# Patient Record
Sex: MALE | Race: WHITE | NOT HISPANIC OR LATINO | Employment: OTHER | URBAN - METROPOLITAN AREA
[De-identification: names, ages, dates, MRNs, and addresses within clinical notes are randomized per-mention and may not be internally consistent; named-entity substitution may affect disease eponyms.]

---

## 2023-03-15 ENCOUNTER — OFFICE VISIT (OUTPATIENT)
Dept: INTERNAL MEDICINE | Age: 33
End: 2023-03-15
Payer: COMMERCIAL

## 2023-03-15 VITALS
BODY MASS INDEX: 26.49 KG/M2 | OXYGEN SATURATION: 97 % | DIASTOLIC BLOOD PRESSURE: 70 MMHG | SYSTOLIC BLOOD PRESSURE: 120 MMHG | TEMPERATURE: 97.6 F | WEIGHT: 159 LBS | HEART RATE: 105 BPM | HEIGHT: 65 IN

## 2023-03-15 DIAGNOSIS — Z76.89 ENCOUNTER TO ESTABLISH CARE: Primary | ICD-10-CM

## 2023-03-15 DIAGNOSIS — Z97.4 WEARS HEARING AID IN BOTH EARS: ICD-10-CM

## 2023-03-15 DIAGNOSIS — F84.9 PERVASIVE DEVELOPMENTAL DISORDER, ACTIVE: ICD-10-CM

## 2023-03-15 DIAGNOSIS — F90.9 ATTENTION DEFICIT HYPERACTIVITY DISORDER (ADHD), UNSPECIFIED ADHD TYPE: ICD-10-CM

## 2023-03-15 DIAGNOSIS — K76.0 FATTY LIVER: ICD-10-CM

## 2023-03-15 DIAGNOSIS — Z79.899 HIGH RISK MEDICATION USE: ICD-10-CM

## 2023-03-15 DIAGNOSIS — G40.309 GENERALIZED IDIOPATHIC EPILEPSY, NOT INTRACTABLE, WITHOUT STATUS EPILEPTICUS: ICD-10-CM

## 2023-03-15 PROCEDURE — 99204 OFFICE O/P NEW MOD 45 MIN: CPT

## 2023-03-15 PROCEDURE — 1160F RVW MEDS BY RX/DR IN RCRD: CPT

## 2023-03-15 PROCEDURE — 1159F MED LIST DOCD IN RCRD: CPT

## 2023-03-15 RX ORDER — LAMOTRIGINE 100 MG/1
1 TABLET ORAL 2 TIMES DAILY
COMMUNITY
Start: 2022-11-14 | End: 2023-03-15 | Stop reason: SDUPTHER

## 2023-03-15 RX ORDER — LANOLIN ALCOHOL/MO/W.PET/CERES
5 CREAM (GRAM) TOPICAL DAILY
COMMUNITY
End: 2023-03-15

## 2023-03-15 RX ORDER — DIETHYLTOLUAMIDE 7 %
SPRAY, NON-AEROSOL (ML) TOPICAL
COMMUNITY

## 2023-03-15 RX ORDER — CHOLECALCIFEROL (VITAMIN D3) 25 MCG
CAPSULE ORAL
COMMUNITY

## 2023-03-15 RX ORDER — MULTIPLE VITAMINS W/ MINERALS TAB 9MG-400MCG
TAB ORAL
COMMUNITY

## 2023-03-15 RX ORDER — UREA 10 %
1 LOTION (ML) TOPICAL DAILY PRN
COMMUNITY
End: 2023-03-15

## 2023-03-15 RX ORDER — LAMOTRIGINE 100 MG/1
100 TABLET ORAL 2 TIMES DAILY
Qty: 180 TABLET | Refills: 0 | Status: SHIPPED | OUTPATIENT
Start: 2023-03-15

## 2023-03-15 RX ORDER — ESCITALOPRAM OXALATE 10 MG/1
1 TABLET ORAL DAILY
COMMUNITY
Start: 2023-02-11 | End: 2023-03-15 | Stop reason: SDUPTHER

## 2023-03-15 RX ORDER — FEXOFENADINE HCL 180 MG/1
180 TABLET ORAL DAILY
COMMUNITY

## 2023-03-15 RX ORDER — ESCITALOPRAM OXALATE 10 MG/1
10 TABLET ORAL DAILY
Qty: 90 TABLET | Refills: 0 | Status: SHIPPED | OUTPATIENT
Start: 2023-03-15

## 2023-03-15 RX ORDER — ESCITALOPRAM OXALATE 10 MG/1
TABLET ORAL
COMMUNITY
End: 2023-03-15

## 2023-03-15 NOTE — PROGRESS NOTES
"    I N T E R N A L  M E D I C I N E  Ciarra Rosenberg, APRN    ENCOUNTER DATE:  03/15/2023    Nicko Vega / 32 y.o. / male      CHIEF COMPLAINT / REASON FOR OFFICE VISIT     Establish Care      ASSESSMENT & PLAN     Diagnoses and all orders for this visit:    1. Encounter to establish care (Primary)    2. Generalized idiopathic epilepsy, not intractable, without status epilepticus (HCC)  -     Ambulatory Referral to Neurology  -     lamoTRIgine (LaMICtal) 100 MG tablet; Take 1 tablet by mouth 2 (Two) Times a Day.  Dispense: 180 tablet; Refill: 0    3. Pervasive developmental disorder, active  Overview:  Formatting of this note might be different from the original.  Overview:   KENISHA GRACE MD    Orders:  -     Ambulatory Referral to Psychiatry  -     Ambulatory Referral to Behavioral Health  -     escitalopram (LEXAPRO) 10 MG tablet; Take 1 tablet by mouth Daily.  Dispense: 90 tablet; Refill: 0    4. Attention deficit hyperactivity disorder (ADHD), unspecified ADHD type  Overview:  Formatting of this note might be different from the original.  Overview:   KENISHA GRACE MD    Orders:  -     Ambulatory Referral to Psychiatry  -     Ambulatory Referral to Behavioral Health  -     escitalopram (LEXAPRO) 10 MG tablet; Take 1 tablet by mouth Daily.  Dispense: 90 tablet; Refill: 0    5. High risk medication use  -     Lamotrigine level    6. Fatty liver  -     Comprehensive metabolic panel    7. Wears hearing aid in both ears  -     Ambulatory Referral to Audiology       SUMMARY/DISCUSSION  • Referrals placed per pt request.  • Agreeable to update annual physical in 3 months.      Next Appointment with me: Visit date not found    Return in about 3 months (around 6/15/2023) for Annual physical.      VITAL SIGNS     Visit Vitals  /70   Pulse 105   Temp 97.6 °F (36.4 °C)   Ht 165.1 cm (65\")   Wt 72.1 kg (159 lb)   SpO2 97%   BMI 26.46 kg/m²             Wt Readings from Last 3 Encounters:   03/15/23 72.1 kg " (159 lb)     Body mass index is 26.46 kg/m².        MEDICATIONS AT THE TIME OF OFFICE VISIT     Current Outpatient Medications on File Prior to Visit   Medication Sig Dispense Refill   • Calcium-Magnesium-Vitamin D ER (Citracal Slow Release) 600- MG-MG-UNIT tablet sustained-release 24 hour      • Cholecalciferol (Vitamin D-3) 25 MCG (1000 UT) capsule      • fexofenadine (ALLEGRA) 180 MG tablet Take 1 tablet by mouth Daily.     • multivitamin with minerals tablet tablet      • SENNA CO by Combination route. 2 nightly     • [DISCONTINUED] escitalopram (LEXAPRO) 10 MG tablet Take 1 tablet by mouth Daily.     • [DISCONTINUED] lamoTRIgine (LaMICtal) 100 MG tablet Take 1 tablet by mouth 2 (Two) Times a Day.     • [DISCONTINUED] calcium carbonate (OS-EMILY) 1250 (500 Ca) MG chewable tablet Chew 1 tablet Daily As Needed.     • [DISCONTINUED] escitalopram (LEXAPRO) 10 MG tablet      • [DISCONTINUED] melatonin 3 MG tablet Take 5 mg by mouth Daily.       No current facility-administered medications on file prior to visit.        HISTORY OF PRESENT ILLNESS     Accompanied by his aunt to today's visit.  Here to establish care.  He denies any problems or concerns.  Aunt provides assistance with today's history.    Pt has Asperger's/ ADHD.  He was prescribed Escitalopram 10 mg daily by former psychiatrist with benefit for mood.   They would like to establish with both counselor and new psychiatrist.      He is blind.  History of retinitis pigmentosa of both eyes.   He wears hearing aids bilateral ears.       Epilepsy/ seizure: Well controlled on lamotrigine 100 mg BID.  Last seizure was reportedly in 2019.  He is agreeable to establish with new neurologist here in [place].  He has been on other anti seizure medications in the past with difficulties for weight gain/ fatty liver disease.          Patient Care Team:  Ciarra Rosenberg APRN as PCP - General (Family Medicine)    REVIEW OF SYSTEMS     Review of Systems    Constitutional: Negative for chills, fever and unexpected weight change.   Eyes: Positive for visual disturbance (Blind).   Respiratory: Negative for cough, chest tightness and shortness of breath.    Cardiovascular: Negative for chest pain, palpitations and leg swelling.   Neurological: Negative for dizziness, weakness, light-headedness and headaches.          PHYSICAL EXAMINATION     Physical Exam  Vitals reviewed.   Constitutional:       General: He is not in acute distress.     Appearance: Normal appearance. He is not ill-appearing, toxic-appearing or diaphoretic.   HENT:      Head: Normocephalic and atraumatic.   Cardiovascular:      Rate and Rhythm: Normal rate and regular rhythm.      Heart sounds: Normal heart sounds.   Pulmonary:      Effort: Pulmonary effort is normal.      Breath sounds: Normal breath sounds.   Neurological:      Mental Status: He is alert and oriented to person, place, and time. Mental status is at baseline.   Psychiatric:         Mood and Affect: Mood normal.         Behavior: Behavior normal.         Thought Content: Thought content normal.         Judgment: Judgment normal.      Comments: Calm, cooperative throughout exam.            REVIEWED DATA     Labs:           Imaging:            Medical Tests:           Summary of old records / correspondence / consultant report:           Request outside records:

## 2023-03-17 LAB
ALBUMIN SERPL-MCNC: 4.7 G/DL (ref 3.5–5.2)
ALBUMIN/GLOB SERPL: 2.4 G/DL
ALP SERPL-CCNC: 45 U/L (ref 39–117)
ALT SERPL-CCNC: 35 U/L (ref 1–41)
AST SERPL-CCNC: 54 U/L (ref 1–40)
BILIRUB SERPL-MCNC: 0.5 MG/DL (ref 0–1.2)
BUN SERPL-MCNC: 16 MG/DL (ref 6–20)
BUN/CREAT SERPL: 21.6 (ref 7–25)
CALCIUM SERPL-MCNC: 9.9 MG/DL (ref 8.6–10.5)
CHLORIDE SERPL-SCNC: 104 MMOL/L (ref 98–107)
CO2 SERPL-SCNC: 29.4 MMOL/L (ref 22–29)
CREAT SERPL-MCNC: 0.74 MG/DL (ref 0.76–1.27)
EGFRCR SERPLBLD CKD-EPI 2021: 123.5 ML/MIN/1.73
GLOBULIN SER CALC-MCNC: 2 GM/DL
GLUCOSE SERPL-MCNC: 89 MG/DL (ref 65–99)
LAMOTRIGINE SERPL-MCNC: 6.8 UG/ML (ref 2–20)
POTASSIUM SERPL-SCNC: 4.1 MMOL/L (ref 3.5–5.2)
PROT SERPL-MCNC: 6.7 G/DL (ref 6–8.5)
SODIUM SERPL-SCNC: 144 MMOL/L (ref 136–145)

## 2023-04-12 ENCOUNTER — TELEPHONE (OUTPATIENT)
Dept: PSYCHIATRY | Facility: CLINIC | Age: 33
End: 2023-04-12
Payer: COMMERCIAL

## 2023-04-12 NOTE — TELEPHONE ENCOUNTER
I have faxed a records release to [facility] for patient's previous records as requested by provider Haley Riddle.  Fax number is [phone] and phone number is [phone].

## 2023-06-13 ENCOUNTER — TELEPHONE (OUTPATIENT)
Dept: PSYCHIATRY | Facility: CLINIC | Age: 33
End: 2023-06-13
Payer: COMMERCIAL

## 2023-06-15 ENCOUNTER — OFFICE VISIT (OUTPATIENT)
Dept: INTERNAL MEDICINE | Age: 33
End: 2023-06-15
Payer: COMMERCIAL

## 2023-06-15 VITALS
SYSTOLIC BLOOD PRESSURE: 118 MMHG | WEIGHT: 160 LBS | HEIGHT: 65 IN | TEMPERATURE: 98.6 F | HEART RATE: 97 BPM | OXYGEN SATURATION: 98 % | BODY MASS INDEX: 26.66 KG/M2 | DIASTOLIC BLOOD PRESSURE: 74 MMHG

## 2023-06-15 DIAGNOSIS — Z00.00 ANNUAL PHYSICAL EXAM: Primary | ICD-10-CM

## 2023-06-15 NOTE — PROGRESS NOTES
"    I N T E R N A L  M E D I C I N E  [address]      ENCOUNTER DATE:  06/15/2023    Nicko Vega / 32 y.o. / male    CHIEF COMPLAINT     Annual Exam    Accompanied by his aunt to today's visit.  Aunt provides assistance with today's history.    Pt's aunt expresses that pt has been experiencing recurrence of allergic rhinitis symptoms including some rhinorrhea and associated.  He has tolerated Allegra well in the past and recently resumed use.     Now followed by CORRINA Harding, Madie's/ ADHD history.  Continues to be prescribed escitalopram with benefit.       Epilepsy/ seizure: Well controlled on lamotrigine 100 mg BID.  Last seizure was reportedly in 2019.  He is scheduled to establish with neurology, Dr. Santos on July 11, 2023.      VITALS     Visit Vitals  /74   Pulse 97   Temp 98.6 °F (37 °C)   Ht 165.1 cm (65\")   Wt 72.6 kg (160 lb)   SpO2 98%   BMI 26.63 kg/m²       BP Readings from Last 3 Encounters:   06/15/23 118/74   03/15/23 120/70     Wt Readings from Last 3 Encounters:   06/15/23 72.6 kg (160 lb)   03/15/23 72.1 kg (159 lb)      Body mass index is 26.63 kg/m².      MEDICATIONS     Current Outpatient Medications on File Prior to Visit   Medication Sig Dispense Refill    Calcium-Magnesium-Vitamin D ER (Citracal Slow Release) 600- MG-MG-UNIT tablet sustained-release 24 hour       Cholecalciferol (Vitamin D-3) 25 MCG (1000 UT) capsule       escitalopram (LEXAPRO) 10 MG tablet Take 1 tablet by mouth Daily. 90 tablet 0    fexofenadine (ALLEGRA) 180 MG tablet Take 1 tablet by mouth Daily.      lamoTRIgine (LaMICtal) 100 MG tablet Take 1 tablet by mouth 2 (Two) Times a Day. 180 tablet 0    multivitamin with minerals tablet tablet       SENNA CO by Combination route. 2 nightly       No current facility-administered medications on file prior to visit.         HISTORY OF PRESENT ILLNESS      Nicko presents for annual health maintenance visit.    General health: " good  Lifestyle:  Attempting to lose weight?: Yes   Diet: eats moderately healthy  Exercise: Plans to start exercising on stationary bicycle  Tobacco: Never used   Alcohol: occasional/infrequent  Work: Disabled  Reproductive health:  Sexually active?: No   Concern for STD?: No   Sexual problems?: No problems   Sees Urologist?: No   Depression Screening:          3/15/2023     2:18 PM   PHQ-2/PHQ-9 Depression Screening   Little Interest or Pleasure in Doing Things 0-->not at all   Feeling Down, Depressed or Hopeless 0-->not at all   PHQ-9: Brief Depression Severity Measure Score 0         PHQ-2: 0 (Not depressed)     PHQ-9: 0 (Negative screening for depression)    Patient Care Team:  Ciarra Rosenberg APRN as PCP - General (Family Medicine)  Chadd Parry MD as Consulting Physician (Otolaryngology)  ______________________________________________________________________    ALLERGIES  Allergies   Allergen Reactions    Amoxicillin Rash        PFSH:     The following portions of the patient's history were reviewed and updated as appropriate: Allergies / Current Medications / Past Medical History / Surgical History / Social History / Family History    PROBLEM LIST   Patient Active Problem List   Diagnosis    Attention deficit hyperactivity disorder (ADHD)    Pervasive developmental disorder, active    Epilepsy    Expressive language disorder    Generalized idiopathic epilepsy, not intractable, without status epilepticus    Insomnia    Retinitis pigmentosa of both eyes    Seizure    Sensorineural hearing loss (SNHL) of both ears       PAST MEDICAL HISTORY  Past Medical History:   Diagnosis Date    ADHD (attention deficit hyperactivity disorder) 1998    Anxiety 2016    School stress    History of medical problems 1998    Asperger's (Autism Spectrum Disorder)    HL (hearing loss) 1994    after Scarlet Fever progressive    Liver disease June 1, 2021    Non Alcoholic Fatty Liver Disease weight gain from Depakote    Seizures  2003    Visual impairment 1994    Retinitis Pigmentosa       SURGICAL HISTORY  History reviewed. No pertinent surgical history.    SOCIAL HISTORY  Social History     Socioeconomic History    Marital status: Single   Tobacco Use    Smoking status: Never    Smokeless tobacco: Never    Tobacco comments:     Exposed to second hand smoke until 1999   Vaping Use    Vaping Use: Never used   Substance and Sexual Activity    Alcohol use: Not Currently     Comment: Social for special occasions - birthday, New Years [place]    Drug use: Never    Sexual activity: Never       FAMILY HISTORY  Family History   Problem Relation Age of Onset    Diabetes Maternal Grandfather         Type 2 late in life    Heart disease Maternal Grandfather         Cath, stent week before death    Hyperlipidemia Maternal Grandfather     Arthritis Maternal Grandmother         RA    Cancer Maternal Grandmother         Colon, Skin    COPD Maternal Grandmother     Hearing loss Maternal Grandmother         hearing aids    Heart disease Maternal Grandmother         A-fib    Hyperlipidemia Maternal Grandmother     Alcohol abuse Maternal Uncle         Sober for many years    Depression Maternal Uncle     Diabetes Maternal Uncle         Type 2    Hyperlipidemia Maternal Aunt     Hyperlipidemia Maternal Aunt     Hyperlipidemia Maternal Uncle        IMMUNIZATION HISTORY  Immunization History   Administered Date(s) Administered    COVID-19 (PFIZER) BIVALENT 12+YRS 12/06/2022    DTaP 01/19/1991, 02/21/1991, 05/06/1991, 05/12/1992, 03/01/1996    Flu Vaccine Split Quad 10/24/2008, 10/30/2009, 11/05/2010, 10/07/2011, 10/08/2014, 10/28/2015    FluLaval/Fluzone >6mos 11/01/2016, 10/19/2017, 10/01/2018, 10/15/2019, 10/19/2020, 10/19/2021    Flublok 18+yrs 12/06/2022    Hepatitis B Adult/Adolescent IM 10/03/2001, 04/10/2002, 11/07/2002    Hib (PRP-T) 02/21/1991, 05/06/1991, 02/07/1992    IPV 01/14/1991, 02/21/1991, 05/12/1992, 03/01/1996    Influenza Quad Vaccine (Inpatient)  11/06/2012, 09/30/2013    MMR 02/07/1992, 03/01/1996    Meningococcal MCV4P (Menactra) 04/15/2008    OPV 01/14/1991, 02/21/1991, 05/12/1992, 03/01/1996    PPD Test 01/26/2010         REVIEW OF SYSTEMS     Review of Systems   Constitutional:  Negative for chills, fever and unexpected weight change.   Respiratory:  Negative for cough, chest tightness and shortness of breath.    Cardiovascular:  Negative for chest pain, palpitations and leg swelling.   Neurological:  Negative for dizziness, weakness, light-headedness and headaches.   Psychiatric/Behavioral:  The patient is not nervous/anxious.      PHYSICAL EXAMINATION     Physical Exam  Vitals reviewed.   Constitutional:       General: He is not in acute distress.     Appearance: Normal appearance. He is not ill-appearing, toxic-appearing or diaphoretic.   HENT:      Head: Normocephalic and atraumatic.      Right Ear: Tympanic membrane, ear canal and external ear normal. There is no impacted cerumen.      Left Ear: Tympanic membrane, ear canal and external ear normal. There is no impacted cerumen.      Ears:      Comments: Wears bilateral hearing aids       Nose: Nose normal. No congestion or rhinorrhea.      Mouth/Throat:      Mouth: Mucous membranes are moist. Mucous membranes are dry.      Pharynx: Oropharynx is clear. No oropharyngeal exudate or posterior oropharyngeal erythema.   Eyes:      Comments: Pt is blind   Cardiovascular:      Rate and Rhythm: Normal rate and regular rhythm.      Heart sounds: Normal heart sounds.   Pulmonary:      Effort: Pulmonary effort is normal. No respiratory distress.      Breath sounds: Normal breath sounds.   Abdominal:      General: Bowel sounds are normal.      Palpations: Abdomen is soft.      Tenderness: There is no abdominal tenderness.   Musculoskeletal:         General: Normal range of motion.      Cervical back: Normal range of motion and neck supple.      Right lower leg: No edema.      Left lower leg: No edema.    Lymphadenopathy:      Cervical: No cervical adenopathy.   Skin:     General: Skin is warm and dry.   Neurological:      General: No focal deficit present.      Mental Status: He is alert and oriented to person, place, and time. Mental status is at baseline.   Psychiatric:         Mood and Affect: Mood normal.         Behavior: Behavior normal.         Thought Content: Thought content normal.         Judgment: Judgment normal.       REVIEWED DATA      Labs:    Lab Results   Component Value Date     03/15/2023    K 4.1 03/15/2023    CALCIUM 9.9 03/15/2023    AST 54 (H) 03/15/2023    ALT 35 03/15/2023    BUN 16 03/15/2023    CREATININE 0.74 (L) 03/15/2023    CREATININE 0.83 11/14/2019    CREATININE 0.78 11/29/2018    EGFRIFNONA >60 11/14/2019    EGFRIFAFRI >60 11/14/2019       Lab Results   Component Value Date    GLUCOSE 89 03/15/2023       No results found for: PSA    [unfilled]    No results found for: LDL, HDL, TRIG, CHOLHDLRATIO    No components found for: PIWW121Q    No results found for: WBC, HGB, MCV, PLT    No results found for: PROTEIN, GLUCOSEU, BLOODU, NITRITEU, LEUKOCYTESUR     No results found for: HEPCVIRUSABY    Imaging:           Medical Tests:           ASSESSMENT & PLAN     ANNUAL WELLNESS EXAM / PHYSICAL     Diagnoses and all orders for this visit:    1. Annual physical exam (Primary)  -     CBC & Differential  -     Comprehensive Metabolic Panel  -     Hemoglobin A1c  -     Lipid Panel With / Chol / HDL Ratio  -     TSH+Free T4  -     Urinalysis without microscopic (no culture) - Urine, Clean Catch         Summary/Discussion:     Recommend that he add Flonase nasal spray for allergic rhinitis symptoms.      Next Appointment with me: Visit date not found    Return in about 1 year (around 6/15/2024) for Annual physical.      HEALTHCARE MAINTENANCE ISSUES       Cancer Screening:  Colon: Initial/Next screening at age: 45  Repeat colon cancer screening: N/A at this time  Prostate: No screening  needed at this time  Testicular: Recommended monthly self exam  Skin: Monthly self skin examination, annual exam by health professional  Lung: Does not meet criteria for lung cancer screening.   Other:    Screening Labs & Tests:  Lab results reviewed & discussed with with patient or orders placed today.  EKG:  CV Screening: Lipid panel  DEXA (75+ or risk factors):   HEP C (If born [id number] or risk factors): Previously had negative screen  Other:     Immunization/Vaccinations (to be given today unless deferred by patient)  Influenza: Recommended annual influenza vaccine  Hepatitis A: Verify immunization records  Hepatitis B: Verify immunization records  Tetanus/Pertussis: Up to date  Pneumovax/PCV: Recommended here or at pharmacy  Shingles: Not needed at this time  COVID: Had the bivalent vaccine  Lifestyle Counseling:  Lifestyle Modifications: Attempt to lose weight, Continue good lifestyle choices/modifications, Begin progressive aerobic exercise program 3-5 days a week, and Make dinner the lightest meal of day  Safety Issues: Always wear seatbelt, Avoid texting while driving   Use sunscreen, regular skin examination  Recommended annual dental/vision examination.  Emotional/Stress/Sleep: Reviewed and  given when appropriate      Health Maintenance   Topic Date Due    Pneumococcal Vaccine 0-64 (1 - PCV) Never done    INFLUENZA VACCINE  10/01/2023    ANNUAL PHYSICAL  06/15/2024    TDAP/TD VACCINES (4 - Td or Tdap) 06/20/2028    HEPATITIS C SCREENING  Completed    COVID-19 Vaccine  Completed

## 2023-06-16 LAB
ALBUMIN SERPL-MCNC: 4.9 G/DL (ref 4–5)
ALBUMIN/GLOB SERPL: 2.3 {RATIO} (ref 1.2–2.2)
ALP SERPL-CCNC: 55 IU/L (ref 44–121)
ALT SERPL-CCNC: 27 IU/L (ref 0–44)
AST SERPL-CCNC: 26 IU/L (ref 0–40)
BASOPHILS # BLD AUTO: 0.1 X10E3/UL (ref 0–0.2)
BASOPHILS NFR BLD AUTO: 1 %
BILIRUB SERPL-MCNC: 0.9 MG/DL (ref 0–1.2)
BUN SERPL-MCNC: 12 MG/DL (ref 6–20)
BUN/CREAT SERPL: 14 (ref 9–20)
CALCIUM SERPL-MCNC: 9.7 MG/DL (ref 8.7–10.2)
CHLORIDE SERPL-SCNC: 101 MMOL/L (ref 96–106)
CHOLEST SERPL-MCNC: 129 MG/DL (ref 100–199)
CHOLEST/HDLC SERPL: 3.4 RATIO (ref 0–5)
CO2 SERPL-SCNC: 24 MMOL/L (ref 20–29)
CREAT SERPL-MCNC: 0.88 MG/DL (ref 0.76–1.27)
EGFRCR SERPLBLD CKD-EPI 2021: 117 ML/MIN/1.73
EOSINOPHIL # BLD AUTO: 0.3 X10E3/UL (ref 0–0.4)
EOSINOPHIL NFR BLD AUTO: 3 %
ERYTHROCYTE [DISTWIDTH] IN BLOOD BY AUTOMATED COUNT: 12.2 % (ref 11.6–15.4)
GLOBULIN SER CALC-MCNC: 2.1 G/DL (ref 1.5–4.5)
GLUCOSE SERPL-MCNC: 90 MG/DL (ref 70–99)
GLUCOSE UR QL STRIP: NORMAL
HBA1C MFR BLD: 5.5 % (ref 4.8–5.6)
HCT VFR BLD AUTO: 45.5 % (ref 37.5–51)
HDLC SERPL-MCNC: 38 MG/DL
HGB BLD-MCNC: 15.9 G/DL (ref 13–17.7)
IMM GRANULOCYTES # BLD AUTO: 0 X10E3/UL (ref 0–0.1)
IMM GRANULOCYTES NFR BLD AUTO: 0 %
KETONES UR QL STRIP: NORMAL
LDLC SERPL CALC-MCNC: 73 MG/DL (ref 0–99)
LYMPHOCYTES # BLD AUTO: 2.2 X10E3/UL (ref 0.7–3.1)
LYMPHOCYTES NFR BLD AUTO: 24 %
MCH RBC QN AUTO: 30.2 PG (ref 26.6–33)
MCHC RBC AUTO-ENTMCNC: 34.9 G/DL (ref 31.5–35.7)
MCV RBC AUTO: 86 FL (ref 79–97)
MONOCYTES # BLD AUTO: 0.5 X10E3/UL (ref 0.1–0.9)
MONOCYTES NFR BLD AUTO: 6 %
NEUTROPHILS # BLD AUTO: 6.1 X10E3/UL (ref 1.4–7)
NEUTROPHILS NFR BLD AUTO: 66 %
PH UR STRIP: NORMAL [PH]
PLATELET # BLD AUTO: 302 X10E3/UL (ref 150–450)
POTASSIUM SERPL-SCNC: 4.3 MMOL/L (ref 3.5–5.2)
PROT SERPL-MCNC: 7 G/DL (ref 6–8.5)
PROT UR QL STRIP: NORMAL
RBC # BLD AUTO: 5.27 X10E6/UL (ref 4.14–5.8)
SODIUM SERPL-SCNC: 141 MMOL/L (ref 134–144)
SP GR UR STRIP: NORMAL
SPECIMEN STATUS: NORMAL
T4 FREE SERPL-MCNC: 1.32 NG/DL (ref 0.82–1.77)
TRIGL SERPL-MCNC: 94 MG/DL (ref 0–149)
TSH SERPL DL<=0.005 MIU/L-ACNC: 2.21 UIU/ML (ref 0.45–4.5)
UNABLE TO VOID: NORMAL
VLDLC SERPL CALC-MCNC: 18 MG/DL (ref 5–40)
WBC # BLD AUTO: 9.3 X10E3/UL (ref 3.4–10.8)

## 2023-07-11 ENCOUNTER — OFFICE VISIT (OUTPATIENT)
Dept: NEUROLOGY | Facility: CLINIC | Age: 33
End: 2023-07-11
Payer: COMMERCIAL

## 2023-07-11 VITALS — BODY MASS INDEX: 26.63 KG/M2 | HEIGHT: 65 IN

## 2023-07-11 DIAGNOSIS — G40.309 GENERALIZED IDIOPATHIC EPILEPSY, NOT INTRACTABLE, WITHOUT STATUS EPILEPTICUS: ICD-10-CM

## 2023-07-11 PROCEDURE — 1159F MED LIST DOCD IN RCRD: CPT | Performed by: STUDENT IN AN ORGANIZED HEALTH CARE EDUCATION/TRAINING PROGRAM

## 2023-07-11 PROCEDURE — 1160F RVW MEDS BY RX/DR IN RCRD: CPT | Performed by: STUDENT IN AN ORGANIZED HEALTH CARE EDUCATION/TRAINING PROGRAM

## 2023-07-11 PROCEDURE — 99204 OFFICE O/P NEW MOD 45 MIN: CPT | Performed by: STUDENT IN AN ORGANIZED HEALTH CARE EDUCATION/TRAINING PROGRAM

## 2023-07-11 RX ORDER — LAMOTRIGINE 100 MG/1
100 TABLET ORAL 2 TIMES DAILY
Qty: 180 TABLET | Refills: 2 | Status: SHIPPED | OUTPATIENT
Start: 2023-07-11

## 2023-07-11 NOTE — LETTER
July 27, 2023     [address]  [address]  [facility] [address]  [facility] [address]    Patient: Nicko Vega   YOB: 1990   Date of Visit: 7/11/2023     Dear [address]:       Thank you for referring Nicko Vega to me for evaluation. Below are the relevant portions of my assessment and plan of care.    If you have questions, please do not hesitate to call me. I look forward to following Nicko along with you.         Sincerely,        Nixon Santos MD        CC: No Recipients    Nixon Santos MD  07/27/23 0728  Sign when Signing Visit  Chief Complaint   Patient presents with   • Seizures       Patient ID: Nicko Vega is a 32 y.o. male.    HPI:    The following portions of the patient's history were reviewed and updated as appropriate: allergies, current medications, past family history, past medical history, past social history, past surgical history and problem list.    Review of Systems   Eyes:  Positive for visual disturbance. Negative for photophobia and redness.   Endocrine: Negative for cold intolerance, heat intolerance and polydipsia.   Genitourinary:  Negative for decreased urine volume, difficulty urinating and urgency.   Musculoskeletal:  Negative for back pain, neck pain and neck stiffness.   Skin:  Negative for color change, rash and wound.   Allergic/Immunologic: Negative for environmental allergies, food allergies and immunocompromised state.   Neurological:  Negative for dizziness, tremors, seizures (last seziure 2018 or 2019), syncope, facial asymmetry, speech difficulty, weakness, light-headedness, numbness and headaches.   Hematological:  Negative for adenopathy. Does not bruise/bleed easily.   Psychiatric/Behavioral:  Negative for confusion, decreased concentration and sleep disturbance. The patient is not nervous/anxious.     Mr. Vega is a 32-year-old male with a history of seizures presenting as a new patient referred by [address].  He was  seen by an epilepsy clinic in [place].  His last visit around that time per the notes was January 2022 he was on Depakote in the past but this was discontinued and he takes lamotrigine 100 twice daily.  He had May 2021 blood work which indicated elevated but improved liver function tests.  He had ultrasound of his liver and was diagnosed with fatty liver disease.  He implemented lifestyle and dietary changes that led to 33 pounds of weight loss and normal LFTs.  He has had normal therapeutic range lamotrigine in the past.  He reportedly has not had side effects on lamotrigine and is good about taking medications and he did not have seizures in a year and his last seizure was documented as 2018.  In terms of his past epilepsy history he had a tonic-clonic seizure when he was 13 and was started on Depakote he had febrile seizures as a toddler and absence seizure's through childhood his EEG in 2007 demonstrated occasional sharp waves from the frontal region predominantly right frontal.  Repeat EEG showed absence seizure's characterized by spacing out and triggered by hyperventilation.  It was thought in the past that he had absent seizures and generalized epilepsy but one of the neurologist suspected that he had focal seizures.  He had an MRI of the brain which showed diminutive ocular globes bilaterally with no definite mesial temporal abnormality no definite evidence of neuronal migration abnormally gray matter heterotopia or cortical dysplasia.  He was switched from Depakote to oxcarbazepine and had a small seizure and had mood changes and Depakote was resumed and oxcarbazepine dosage was reduced and I assume later stopped as he is not on it now.  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  He has had various spell types 1 of which is characterized by generalized tonic-clonic seizures with an aura of sometimes  having the feeling of the sound of a waterfall and/or headache and dizziness prior to seizure.  Postictally he is confused with these.  In the past he has had absence seizures staring and spacing out with an aura of feeling the sound of waterfall.  Triggers can include hyperventilation he lives with his and.  He has had elevated LFTs in the past and lamotrigine level within therapeutic range in the past reportedly.  His liver function enzymes were normal with his last check.  His lamotrigine level was 6.8 3 months ago and his CBC was normal.  Age of onset of seizure was 2003. Most of what he has had are that he goes into a trance, bubbles at the mouth. He confirms that he has a waterfall sound prior to having a seizure, shortly before. Last in 2018 in setting of oxcarbazepine. Per EMR, hyperventilation can be a trigger. Aunt notes stress can be a seizure. College can be overwhelming.  Aunt notes a shoulder twitch at times. Enjoys theater and musicals. No seizures, no clear side effects on lamotrigine. Photosensitive but unable to see. Has hearing impairment.     Risk factors  Birth - on time, no known complications  Development - Asperger's syndrome and retinitis pigmentosa. Graduated with a theater degree  CNS infection - none  Has history of retinitis pigmentosa.  Head injury- none  Family history - none known of sz but don't know genetics of biological father    Prior meds VPA, OXC  Current meds  BID    There were no vitals filed for this visit.    Neurologic Exam     Mental Status   Speech: speech is normal   Level of consciousness: alert    Cranial Nerves     CN III, IV, VI   Extraocular motions are normal.     CN V   Facial sensation intact.     CN VII   Facial expression full, symmetric.     CN VIII   Hearing: impaired    CN IX, X   Palate: symmetric    CN XI   Right trapezius strength: normal  Left trapezius strength: normal    CN XII   Tongue: not atrophic  Fasciculations: absent  Tongue deviation:  none  Hard to tell pupil reaction due to eye movements but pt notes when light      Motor Exam   Muscle bulk: normal    Strength   Right deltoid: 5/5  Left deltoid: 5/5  Right biceps: 5/5  Left biceps: 5/5  Right triceps: 5/5  Left triceps: 5/5  Right iliopsoas: 5/5  Left iliopsoas: 5/5  Right quadriceps: 5/5  Left quadriceps: 5/5  Right hamstrin/5  Left hamstrin/5  Right anterior tibial: 5/5  Left anterior tibial: 5/5  Right gastroc: 5/5  Left gastroc: 5/5Grip 5 out of 5 bilaterally     Sensory Exam   Right arm light touch: normal  Left arm light touch: normal  Right leg light touch: normal  Left leg light touch: normal    Gait, Coordination, and Reflexes     Coordination   Finger to nose coordination: normal  Heel to shin coordination: normal    Reflexes   Right brachioradialis: 2+  Left brachioradialis: 2+  Right biceps: 2+  Left biceps: 2+  Right patellar: 2+  Left patellar: 2+  Right achilles: 2+  Left achilles: 2+    Physical Exam  Eyes:      Extraocular Movements: EOM normal.   Neurological:      Coordination: Finger-Nose-Finger Test and Heel to Shin Test normal.      Deep Tendon Reflexes:      Reflex Scores:       Bicep reflexes are 2+ on the right side and 2+ on the left side.       Brachioradialis reflexes are 2+ on the right side and 2+ on the left side.       Patellar reflexes are 2+ on the right side and 2+ on the left side.       Achilles reflexes are 2+ on the right side and 2+ on the left side.  Psychiatric:         Speech: Speech normal.     Procedures    Assessment/Plan:    The patient has epilepsy based on clinical history and testing.  He has not had a seizure since 2019.  Continue lamotrigine 100 mg twice daily.  Seizure precautions discussed.  The patient is blind and does not drive.  He has had elevated LFTs in the past but recent lab work in  shows no transaminitis.  -The patient has epilepsy which is a chronic condition that poses a threat to life or serious harm  -Prescription  medications are managed in this visit       There are no diagnoses linked to this encounter.       Nixon Santos MD

## 2023-07-11 NOTE — PROGRESS NOTES
Chief Complaint   Patient presents with    Seizures       Patient ID: Nicko Vega is a 32 y.o. male.    HPI:    The following portions of the patient's history were reviewed and updated as appropriate: allergies, current medications, past family history, past medical history, past social history, past surgical history and problem list.    Review of Systems   Eyes:  Positive for visual disturbance. Negative for photophobia and redness.   Endocrine: Negative for cold intolerance, heat intolerance and polydipsia.   Genitourinary:  Negative for decreased urine volume, difficulty urinating and urgency.   Musculoskeletal:  Negative for back pain, neck pain and neck stiffness.   Skin:  Negative for color change, rash and wound.   Allergic/Immunologic: Negative for environmental allergies, food allergies and immunocompromised state.   Neurological:  Negative for dizziness, tremors, seizures (last seziure 2018 or 2019), syncope, facial asymmetry, speech difficulty, weakness, light-headedness, numbness and headaches.   Hematological:  Negative for adenopathy. Does not bruise/bleed easily.   Psychiatric/Behavioral:  Negative for confusion, decreased concentration and sleep disturbance. The patient is not nervous/anxious.     Mr. Vega is a 32-year-old male with a history of seizures presenting as a new patient referred by [address].  He was seen by an epilepsy clinic in [place].  His last visit around that time per the notes was January 2022 he was on Depakote in the past but this was discontinued and he takes lamotrigine 100 twice daily.  He had May 2021 blood work which indicated elevated but improved liver function tests.  He had ultrasound of his liver and was diagnosed with fatty liver disease.  He implemented lifestyle and dietary changes that led to 33 pounds of weight loss and normal LFTs.  He has had normal therapeutic range lamotrigine in the past.  He reportedly has not had side effects on  lamotrigine and is good about taking medications and he did not have seizures in a year and his last seizure was documented as 2018.  In terms of his past epilepsy history he had a tonic-clonic seizure when he was 13 and was started on Depakote he had febrile seizures as a toddler and absence seizure's through childhood his EEG in 2007 demonstrated occasional sharp waves from the frontal region predominantly right frontal.  Repeat EEG showed absence seizure's characterized by spacing out and triggered by hyperventilation.  It was thought in the past that he had absent seizures and generalized epilepsy but one of the neurologist suspected that he had focal seizures.  He had an MRI of the brain which showed diminutive ocular globes bilaterally with no definite mesial temporal abnormality no definite evidence of neuronal migration abnormally gray matter heterotopia or cortical dysplasia.  He was switched from Depakote to oxcarbazepine and had a small seizure and had mood changes and Depakote was resumed and oxcarbazepine dosage was reduced and I assume later stopped as he is not on it now.  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  He has had various spell types 1 of which is characterized by generalized tonic-clonic seizures with an aura of sometimes having the feeling of the sound of a waterfall and/or headache and dizziness prior to seizure.  Postictally he is confused with these.  In the past he has had absence seizures staring and spacing out with an aura of feeling the sound of waterfall.  Triggers can include hyperventilation he lives with his and.  He has had elevated LFTs in the past and lamotrigine level within therapeutic range in the past reportedly.  His liver function enzymes were normal with his last check.  His lamotrigine level was 6.8 3 months ago and his CBC was normal.  Age of onset of seizure was  . Most of what he has had are that he goes into a trance, bubbles at the mouth. He confirms that he has a waterfall sound prior to having a seizure, shortly before. Last in 2018 in setting of oxcarbazepine. Per EMR, hyperventilation can be a trigger. Aunt notes stress can be a seizure. College can be overwhelming.  Aunt notes a shoulder twitch at times. Enjoys theater and musicals. No seizures, no clear side effects on lamotrigine. Photosensitive but unable to see. Has hearing impairment.     Risk factors  Birth - on time, no known complications  Development - Asperger's syndrome and retinitis pigmentosa. Graduated with a theater degree  CNS infection - none  Has history of retinitis pigmentosa.  Head injury- none  Family history - none known of sz but don't know genetics of biological father    Prior meds VPA, OXC  Current meds  BID    There were no vitals filed for this visit.    Neurologic Exam     Mental Status   Speech: speech is normal   Level of consciousness: alert    Cranial Nerves     CN III, IV, VI   Extraocular motions are normal.     CN V   Facial sensation intact.     CN VII   Facial expression full, symmetric.     CN VIII   Hearing: impaired    CN IX, X   Palate: symmetric    CN XI   Right trapezius strength: normal  Left trapezius strength: normal    CN XII   Tongue: not atrophic  Fasciculations: absent  Tongue deviation: none  Hard to tell pupil reaction due to eye movements but pt notes when light      Motor Exam   Muscle bulk: normal    Strength   Right deltoid: 5/5  Left deltoid: 5/5  Right biceps: 5/5  Left biceps: 5/5  Right triceps: 5/5  Left triceps: 5/5  Right iliopsoas: 5/5  Left iliopsoas: 5/5  Right quadriceps: 5/5  Left quadriceps: 5/5  Right hamstrin/5  Left hamstrin/5  Right anterior tibial: 5/5  Left anterior tibial: 5/5  Right gastroc: 5/5  Left gastroc: 5/5Grip 5 out of 5 bilaterally     Sensory Exam   Right arm light touch: normal  Left arm light touch:  normal  Right leg light touch: normal  Left leg light touch: normal    Gait, Coordination, and Reflexes     Coordination   Finger to nose coordination: normal  Heel to shin coordination: normal    Reflexes   Right brachioradialis: 2+  Left brachioradialis: 2+  Right biceps: 2+  Left biceps: 2+  Right patellar: 2+  Left patellar: 2+  Right achilles: 2+  Left achilles: 2+    Physical Exam  Eyes:      Extraocular Movements: EOM normal.   Neurological:      Coordination: Finger-Nose-Finger Test and Heel to Shin Test normal.      Deep Tendon Reflexes:      Reflex Scores:       Bicep reflexes are 2+ on the right side and 2+ on the left side.       Brachioradialis reflexes are 2+ on the right side and 2+ on the left side.       Patellar reflexes are 2+ on the right side and 2+ on the left side.       Achilles reflexes are 2+ on the right side and 2+ on the left side.  Psychiatric:         Speech: Speech normal.     Procedures    Assessment/Plan:    The patient has epilepsy based on clinical history and testing.  He has not had a seizure since 2019.  Continue lamotrigine 100 mg twice daily.  Seizure precautions discussed.  The patient is blind and does not drive.  He has had elevated LFTs in the past but recent lab work in June shows no transaminitis.  -The patient has epilepsy which is a chronic condition that poses a threat to life or serious harm  -Prescription medications are managed in this visit       There are no diagnoses linked to this encounter.       Nixon Santos MD

## 2023-07-11 NOTE — LETTER
July 11, 2023       No Recipients    Patient: Nicko Vega   YOB: 1990   Date of Visit: 7/11/2023     Dear [address]:       Thank you for referring Nicko Vega to me for evaluation. Below are the relevant portions of my assessment and plan of care.    If you have questions, please do not hesitate to call me. I look forward to following Nicko along with you.         Sincerely,        Nixon Santos MD        CC:   No Recipients    Nixon Santos MD  07/11/23 1646  Sign when Signing Visit  Chief Complaint   Patient presents with   • Seizures       Patient ID: Nicko Vega is a 32 y.o. male.    HPI:    The following portions of the patient's history were reviewed and updated as appropriate: allergies, current medications, past family history, past medical history, past social history, past surgical history and problem list.    Review of Systems   Eyes:  Positive for visual disturbance. Negative for photophobia and redness.   Endocrine: Negative for cold intolerance, heat intolerance and polydipsia.   Genitourinary:  Negative for decreased urine volume, difficulty urinating and urgency.   Musculoskeletal:  Negative for back pain, neck pain and neck stiffness.   Skin:  Negative for color change, rash and wound.   Allergic/Immunologic: Negative for environmental allergies, food allergies and immunocompromised state.   Neurological:  Negative for dizziness, tremors, seizures (last seziure 2018 or 2019), syncope, facial asymmetry, speech difficulty, weakness, light-headedness, numbness and headaches.   Hematological:  Negative for adenopathy. Does not bruise/bleed easily.   Psychiatric/Behavioral:  Negative for confusion, decreased concentration and sleep disturbance. The patient is not nervous/anxious.     Mr. Vega is a 32-year-old male with a history of seizures presenting as a new patient referred by [address].  He was seen by an epilepsy clinic in [place].  His last  visit around that time per the notes was January 2022 he was on Depakote in the past but this was discontinued and he takes lamotrigine 100 twice daily.  He had May 2021 blood work which indicated elevated but improved liver function tests.  He had ultrasound of his liver and was diagnosed with fatty liver disease.  He implemented lifestyle and dietary changes that led to 33 pounds of weight loss and normal LFTs.  He has had normal therapeutic range lamotrigine in the past.  He reportedly has not had side effects on lamotrigine and is good about taking medications and he did not have seizures in a year and his last seizure was documented as 2018.  In terms of his past epilepsy history he had a tonic-clonic seizure when he was 13 and was started on Depakote he had febrile seizures as a toddler and absence seizure's through childhood his EEG in 2007 demonstrated occasional sharp waves from the frontal region predominantly right frontal.  Repeat EEG showed absence seizure's characterized by spacing out and triggered by hyperventilation.  It was thought in the past that he had absent seizures and generalized epilepsy but one of the neurologist suspected that he had focal seizures.  He had an MRI of the brain which showed diminutive ocular globes bilaterally with no definite mesial temporal abnormality no definite evidence of neuronal migration abnormally gray matter heterotopia or cortical dysplasia.  He was switched from Depakote to oxcarbazepine and had a small seizure and had mood changes and Depakote was resumed and oxcarbazepine dosage was reduced and I assume later stopped as he is not on it now.  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  He has had various spell types 1 of which is characterized by generalized tonic-clonic seizures with an aura of sometimes having the feeling of the sound of a waterfall and/or  headache and dizziness prior to seizure.  Postictally he is confused with these.  In the past he has had absence seizure's staring and spacing out with an aura of feeling the sound of waterfall.  Triggers can include hyperventilation he lives with his and.  He has had elevated LFTs in the past and lamotrigine level within therapeutic range in the past reportedly.  His liver function enzymes were normal with his last check.  His lamotrigine level was 6.8 3 months ago and his CBC was normal.  Age of onset of seizure was 2003. Most of what he has had are absence. Goes into a trance, bubbles at the mouth. He confirms that he has a waterfall sound prior to having a seizure, shortly before. Last in 2018 in setting of oxcarbazepine. Per EMR, hyperventilation can be a trigger. Aunt notes stress can be a seizure. College can be overwhelming.  Aunt notes a shoulder twitch at times. Enjoys theater and musicals. No seizures, no clear side effects on lamotrigine. Photosensitive but unable to see. Has hearing impairment.     Risk factors  Birth - on time, no known complications  Development - Asperger's syndrome and retinitis pigmentosa. Graduated with a theater degree  CNS infection - none  Has history of retinitis pigmentosa.  Head injury- none  Family history - none known of sz but don't know genetics of biological father    Prior meds VPA, OXC  Current meds  BID    There were no vitals filed for this visit.    Neurologic Exam     Mental Status   Speech: speech is normal   Level of consciousness: alert    Cranial Nerves     CN III, IV, VI   Extraocular motions are normal.     CN V   Facial sensation intact.     CN VII   Facial expression full, symmetric.     CN VIII   Hearing: impaired    CN IX, X   Palate: symmetric    CN XI   Right trapezius strength: normal  Left trapezius strength: normal    CN XII   Tongue: not atrophic  Fasciculations: absent  Tongue deviation: none  Hard to tell pupil reaction due to eye  movements but pt notes when light      Motor Exam   Muscle bulk: normal    Strength   Right deltoid: 5/5  Left deltoid: 5/5  Right biceps: 5/5  Left biceps: 5/5  Right triceps: 5/5  Left triceps: 5/5  Right iliopsoas: 5/5  Left iliopsoas: 5/5  Right quadriceps: 5/5  Left quadriceps: 5/5  Right hamstrin/5  Left hamstrin/5  Right anterior tibial: 5/5  Left anterior tibial: 5/5  Right gastroc: 5/5  Left gastroc: 5/5Grip 5 out of 5 bilaterally     Sensory Exam   Right arm light touch: normal  Left arm light touch: normal  Right leg light touch: normal  Left leg light touch: normal    Gait, Coordination, and Reflexes     Coordination   Finger to nose coordination: normal  Heel to shin coordination: normal    Reflexes   Right brachioradialis: 2+  Left brachioradialis: 2+  Right biceps: 2+  Left biceps: 2+  Right patellar: 2+  Left patellar: 2+  Right achilles: 2+  Left achilles: 2+    Physical Exam  Eyes:      Extraocular Movements: EOM normal.   Neurological:      Coordination: Finger-Nose-Finger Test and Heel to Shin Test normal.      Deep Tendon Reflexes:      Reflex Scores:       Bicep reflexes are 2+ on the right side and 2+ on the left side.       Brachioradialis reflexes are 2+ on the right side and 2+ on the left side.       Patellar reflexes are 2+ on the right side and 2+ on the left side.       Achilles reflexes are 2+ on the right side and 2+ on the left side.  Psychiatric:         Speech: Speech normal.     Procedures    Assessment/Plan:         There are no diagnoses linked to this encounter.       Nixon Santos MD

## 2023-08-23 ENCOUNTER — APPOINTMENT (OUTPATIENT)
Facility: HOSPITAL | Age: 33
End: 2023-08-23
Payer: COMMERCIAL

## 2023-08-23 PROCEDURE — 73610 X-RAY EXAM OF ANKLE: CPT

## 2023-08-24 DIAGNOSIS — S82.55XS CLOSED NONDISPLACED FRACTURE OF MEDIAL MALLEOLUS OF LEFT TIBIA, SEQUELA: Primary | ICD-10-CM

## 2023-09-25 ENCOUNTER — TELEMEDICINE (OUTPATIENT)
Dept: PSYCHIATRY | Facility: CLINIC | Age: 33
End: 2023-09-25

## 2023-09-25 DIAGNOSIS — F84.0 AUTISM SPECTRUM DISORDER: Primary | Chronic | ICD-10-CM

## 2023-09-25 DIAGNOSIS — F41.9 ANXIETY DISORDER, UNSPECIFIED TYPE: Chronic | ICD-10-CM

## 2023-09-25 PROCEDURE — 1160F RVW MEDS BY RX/DR IN RCRD: CPT | Performed by: NURSE PRACTITIONER

## 2023-09-25 PROCEDURE — 99214 OFFICE O/P EST MOD 30 MIN: CPT | Performed by: NURSE PRACTITIONER

## 2023-09-25 PROCEDURE — 1159F MED LIST DOCD IN RCRD: CPT | Performed by: NURSE PRACTITIONER

## 2023-09-25 NOTE — PROGRESS NOTES
This provider is completing this appointment through [facility] (through [facility]), [address], [place], [address] using a secure [employer] Video Visit through [employer]. Patient is being seen remotely via telehealth in [place], and stated they are in a secure environment for this session. The patient's condition being diagnosed/treated is appropriate for telemedicine. The provider identified herself as well as her credentials.   The patient, and/or patients guardian, consent to be seen remotely, and when consent is given they understand that the consent allows for patient identifiable information to be sent to a third party as needed.   They may refuse to be seen remotely at any time. The electronic data is encrypted and password protected, and the patient and/or guardian has been advised of the potential risks to privacy not withstanding such measures.    You have chosen to receive care through a telehealth visit.  Do you consent to use a video/audio connection for your medical care today? Yes    Patient identifiers utilized: Name and date of birth.        Subjective   Nicko Vega is a 32 y.o. male who presents today for follow-up    Chief Complaint:  Medication management    Accompanied by:Pt's aunt and POA, Clare Johnson, is also present     History of Present Illness:   Pt was last seen by this APRN on 7/5/23.  Pt reports he has been doing well overall. He did suffer a fracture to his left ankle about a month ago while coming up the stairs. He states he heard the dogs bark, took a step back, and injured himself. Pt's aunt states his provider is happy with the way it is healing and he no longer has to use his boot. 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 When he gets a writer's block, he reads fan fiction. Pt hasn't been playing his keyboard since his injury due to not being able to use the sustain pedal. Pt reports being a night owl, so he can talk  to his friends from around the world when they are awake. Appetite is stable as is mood. Pt is looking forward to the holidays and his birthday. Pt denies anxiety and no longer feels as if he needs the Lexapro. Pt would like to taper off it. The patient denies any suicidal or homicidal ideations, plans, or intent at today's encounter and is convincing. The patient denies any auditory hallucinations or visual hallucinations. The patient does not endorse any significant symptoms consistent with fide or psychosis at today's encounter.     *If the patient has any concerns or needs assistance, they may call the [facility] at [phone]*        Prior Psychiatric Medications:  Lexapro  Strattera - increased liver enzymes  Lamictal - taking for epilepsy  Depakote - took for epilepsy and increased liver enzymes, weight gain  Tegretol - worsened seizures and caused mood issues        The following portions of the patient's history were reviewed and updated as appropriate: allergies, current medications, past family history, past medical history, past social history, past surgical history and problem list.          Past Medical History:  Past Medical History:   Diagnosis Date    ADHD (attention deficit hyperactivity disorder) 1998    Anxiety 2016    School stress    History of medical problems 1998    Asperger's (Autism Spectrum Disorder)    HL (hearing loss) 1994    after Scarlet Fever progressive    Liver disease June 1, 2021    Non Alcoholic Fatty Liver Disease weight gain from Depakote    Seizures 2003    Visual impairment 1994    Retinitis Pigmentosa       Social History:  Social History     Socioeconomic History    Marital status: Single   Tobacco Use    Smoking status: Never     Passive exposure: Never    Smokeless tobacco: Never    Tobacco comments:     Exposed to second hand smoke until 1999   Vaping Use    Vaping Use: Never used   Substance and Sexual Activity    Alcohol use: Not  Currently     Comment: Social for special occasions - birthday, New Years [place]    Drug use: Never    Sexual activity: Never       Family History:  Family History   Problem Relation Age of Onset    Diabetes Maternal Grandfather         Type 2 late in life    Heart disease Maternal Grandfather         Cath, stent week before death    Hyperlipidemia Maternal Grandfather     Arthritis Maternal Grandmother         RA    Cancer Maternal Grandmother         Colon, Skin    COPD Maternal Grandmother     Hearing loss Maternal Grandmother         hearing aids    Heart disease Maternal Grandmother         A-fib    Hyperlipidemia Maternal Grandmother     Alcohol abuse Maternal Uncle         Sober for many years    Depression Maternal Uncle     Diabetes Maternal Uncle         Type 2    Hyperlipidemia Maternal Aunt     Hyperlipidemia Maternal Aunt     Hyperlipidemia Maternal Uncle        Past Surgical History:  History reviewed. No pertinent surgical history.    Problem List:  Patient Active Problem List   Diagnosis    Attention deficit hyperactivity disorder (ADHD)    Pervasive developmental disorder, active    Epilepsy    Expressive language disorder    Generalized idiopathic epilepsy, not intractable, without status epilepticus    Insomnia    Retinitis pigmentosa of both eyes    Seizure    Sensorineural hearing loss (SNHL) of both ears       Allergy:   Allergies   Allergen Reactions    Amoxicillin Rash        Current Medications:   Current Outpatient Medications   Medication Sig Dispense Refill    Calcium-Magnesium-Vitamin D ER (Citracal Slow Release) 600- MG-MG-UNIT tablet sustained-release 24 hour       Cholecalciferol (Vitamin D-3) 25 MCG (1000 UT) capsule       fexofenadine (ALLEGRA) 180 MG tablet Take 1 tablet by mouth Daily.      lamoTRIgine (LaMICtal) 100 MG tablet Take 1 tablet by mouth 2 (Two) Times a Day. 180 tablet 2    multivitamin with minerals tablet tablet        No current facility-administered medications  for this visit.       Review of Symptoms:    Review of Systems   Constitutional: Negative.    Psychiatric/Behavioral: Negative.         Physical Exam:   Due to the remote nature of this encounter (virtual encounter), vitals were unable to be obtained.  Height stated at 65 inches.  Weight stated at 160 pounds.      Physical Exam  Neurological:      Mental Status: He is alert.   Psychiatric:         Attention and Perception: Attention and perception normal. He does not perceive auditory or visual hallucinations.         Mood and Affect: Mood and affect normal.         Speech: Speech normal.         Behavior: Behavior normal. Behavior is cooperative.         Thought Content: Thought content normal. Thought content is not paranoid or delusional. Thought content does not include homicidal or suicidal ideation. Thought content does not include homicidal or suicidal plan.         Cognition and Memory: Memory normal.         Judgment: Judgment normal.      Comments: [place]. Blind. Pt made jokes throughout the appointment and has a good sense of humor.         Mental Status Exam:   Hygiene:   good  Cooperation:  Cooperative  Eye Contact:   Pt is blind. He faced the screen during the appointment  Psychomotor Behavior:  Appropriate  Affect:  Full range  Mood: normal  Speech:  Normal  Thought Process:  Linear  Thought Content:  Normal  Suicidal:  None  Homicidal:  None  Hallucinations:  None  Delusion:  None  Memory:  Intact  Orientation:  Person, Place, Time, and Situation  Reliability:  good  Insight:  Good  Judgement:  Good  Impulse Control:  Good      PHQ-9 Depression Screening  Little interest or pleasure in doing things? (P) 0-->not at all   Feeling down, depressed, or hopeless? (P) 0-->not at all   Trouble falling or staying asleep, or sleeping too much? (P) 0-->not at all   Feeling tired or having little energy? (P) 0-->not at all   Poor appetite or overeating? (P) 0-->not at all   Feeling bad about yourself - or that you  are a failure or have let yourself or your family down? (P) 0-->not at all   Trouble concentrating on things, such as reading the newspaper or watching television? (P) 0-->not at all   Moving or speaking so slowly that other people could have noticed? Or the opposite - being so fidgety or restless that you have been moving around a lot more than usual? (P) 0-->not at all   Thoughts that you would be better off dead, or of hurting yourself in some way? (P) 0-->not at all   PHQ-9 Total Score (P) 0   If you checked off any problems, how difficult have these problems made it for you to do your work, take care of things at home, or get along with other people?       PHQ-9 Total Score: (P) 0      JUSTINO-7  Feeling nervous, anxious or on edge: (P) Not at all  Not being able to stop or control worrying: (P) Not at all  Worrying too much about different things: (P) Not at all  Trouble Relaxing: (P) Not at all  Being so restless that it is hard to sit still: (P) Not at all  Feeling afraid as if something awful might happen: (P) Not at all  Becoming easily annoyed or irritable: (P) Not at all  JUSTINO 7 Total Score: (P) 0      PROMIS scale screening tool that patient filled out virtually prior to encounter beginning reviewed by this APRN at today's encounter.    Previous Provider notes and available records reviewed by this APRN at today's encounter.       Lab Results:   No visits with results within 1 Month(s) from this visit.   Latest known visit with results is:   Office Visit on 06/15/2023   Component Date Value Ref Range Status    WBC 06/15/2023 9.3  3.4 - 10.8 x10E3/uL Final    RBC 06/15/2023 5.27  4.14 - 5.80 x10E6/uL Final    Hemoglobin 06/15/2023 15.9  13.0 - 17.7 g/dL Final    Hematocrit 06/15/2023 45.5  37.5 - 51.0 % Final    MCV 06/15/2023 86  79 - 97 fL Final    MCH 06/15/2023 30.2  26.6 - 33.0 pg Final    MCHC 06/15/2023 34.9  31.5 - 35.7 g/dL Final    RDW 06/15/2023 12.2  11.6 - 15.4 % Final    Platelets 06/15/2023 302   150 - 450 x10E3/uL Final    Neutrophil Rel % 06/15/2023 66  Not Estab. % Final    Lymphocyte Rel % 06/15/2023 24  Not Estab. % Final    Monocyte Rel % 06/15/2023 6  Not Estab. % Final    Eosinophil Rel % 06/15/2023 3  Not Estab. % Final    Basophil Rel % 06/15/2023 1  Not Estab. % Final    Neutrophils Absolute 06/15/2023 6.1  1.4 - 7.0 x10E3/uL Final    Lymphocytes Absolute 06/15/2023 2.2  0.7 - 3.1 x10E3/uL Final    Monocytes Absolute 06/15/2023 0.5  0.1 - 0.9 x10E3/uL Final    Eosinophils Absolute 06/15/2023 0.3  0.0 - 0.4 x10E3/uL Final    Basophils Absolute 06/15/2023 0.1  0.0 - 0.2 x10E3/uL Final    Immature Granulocyte Rel % 06/15/2023 0  Not Estab. % Final    Immature Grans Absolute 06/15/2023 0.0  0.0 - 0.1 x10E3/uL Final    Glucose 06/15/2023 90  70 - 99 mg/dL Final    BUN 06/15/2023 12  6 - 20 mg/dL Final    Creatinine 06/15/2023 0.88  0.76 - 1.27 mg/dL Final    EGFR Result 06/15/2023 117  >59 mL/min/1.73 Final    BUN/Creatinine Ratio 06/15/2023 14  9 - 20 Final    Sodium 06/15/2023 141  134 - 144 mmol/L Final    Potassium 06/15/2023 4.3  3.5 - 5.2 mmol/L Final    Chloride 06/15/2023 101  96 - 106 mmol/L Final    Total CO2 06/15/2023 24  20 - 29 mmol/L Final    Calcium 06/15/2023 9.7  8.7 - 10.2 mg/dL Final    Total Protein 06/15/2023 7.0  6.0 - 8.5 g/dL Final    Albumin 06/15/2023 4.9  4.0 - 5.0 g/dL Final    Globulin 06/15/2023 2.1  1.5 - 4.5 g/dL Final    A/G Ratio 06/15/2023 2.3 (H)  1.2 - 2.2 Final    Total Bilirubin 06/15/2023 0.9  0.0 - 1.2 mg/dL Final    Alkaline Phosphatase 06/15/2023 55  44 - 121 IU/L Final    AST (SGOT) 06/15/2023 26  0 - 40 IU/L Final    ALT (SGPT) 06/15/2023 27  0 - 44 IU/L Final    Hemoglobin A1C 06/15/2023 5.5  4.8 - 5.6 % Final    Comment:          Prediabetes: 5.7 - 6.4           Diabetes: >6.4           Glycemic control for adults with diabetes: <7.0      Total Cholesterol 06/15/2023 129  100 - 199 mg/dL Final    Triglycerides 06/15/2023 94  0 - 149 mg/dL Final    HDL  Cholesterol 06/15/2023 38 (L)  >39 mg/dL Final    VLDL Cholesterol Alvarez 06/15/2023 18  5 - 40 mg/dL Final    LDL Chol Calc (NIH) 06/15/2023 73  0 - 99 mg/dL Final    Chol/HDL Ratio 06/15/2023 3.4  0.0 - 5.0 ratio Final    Comment:                                   T. Chol/HDL Ratio                                              Men  Women                                1/2 Avg.Risk  3.4    3.3                                    Avg.Risk  5.0    4.4                                 2X Avg.Risk  9.6    7.1                                 3X Avg.Risk 23.4   11.0      TSH 06/15/2023 2.210  0.450 - 4.500 uIU/mL Final    Free T4 06/15/2023 1.32  0.82 - 1.77 ng/dL Final    Specific Gravity, UA 06/15/2023 CANCELED   Final-Edited    Comment: Test not performed. Patient was unable to provide a self-collected  specimen for the requested testing. The following test(s) were not  performed:    Result canceled by the ancillary.      pH, UA 06/15/2023 CANCELED   Final-Edited    Comment: Test not performed    Result canceled by the ancillary.      Protein 06/15/2023 CANCELED   Final-Edited    Comment: Test not performed    Result canceled by the ancillary.      Glucose, UA 06/15/2023 CANCELED   Final-Edited    Comment: Test not performed    Result canceled by the ancillary.      Ketones 06/15/2023 CANCELED   Final-Edited    Comment: Test not performed    Result canceled by the ancillary.      Unable to Void 06/15/2023 Comment   Final    Comment: The patient was not able to render a urine sample and has been  instructed to return for a urine collection at their earliest  convenience.  The urine testing that you have requested has  been deleted from this report.  When the patient returns and  provides a urine specimen, the urine testing will be performed  and separately reported.      Specimen Status 06/15/2023 CANCELED   Final-Edited    Comment: Test not performed. Patient was unable to provide a self-collected  specimen for the  requested testing. The following test(s) were not  performed:        TEST:  [id number]  Urinalysis (No Micro)    Result canceled by the ancillary.           Assessment & Plan   Problems Addressed this Visit    None  Visit Diagnoses       Autism spectrum disorder  (Chronic)   -  Primary    Anxiety disorder, unspecified type  (Chronic)             Diagnoses         Codes Comments    Autism spectrum disorder    -  Primary ICD-10-CM: F84.0  ICD-9-CM: 299.00     Anxiety disorder, unspecified type     ICD-10-CM: F41.9  ICD-9-CM: 300.00             Visit Diagnoses:    ICD-10-CM ICD-9-CM   1. Autism spectrum disorder  F84.0 299.00   2. Anxiety disorder, unspecified type  F41.9 300.00          GOALS:  Short Term Goals: Patient will be compliant with medication, and patient will have no significant medication related side effects.  Patient will be engaged in psychotherapy as indicated.  Patient will report subjective improvement of symptoms.  Long term goals: To stabilize mood and treat/improve subjective symptoms, the patient will stay out of the hospital, the patient will be at an optimal level of functioning, and the patient will take all medications as prescribed.  The patient verbalized understanding and agreement with goals that were mutually set.      TREATMENT PLAN:   Continue supportive psychotherapy efforts and medications as indicated.   -Decrease Lexapro to 5 mg PO Daily x5 days, then discontinue  -Pt to follow-up as needed    Medication and treatment options, both pharmacological and non-pharmacological treatment options, discussed during today's visit, including any off label use of medication. Patient acknowledged and verbally consented with current treatment plan and was educated on the importance of compliance with treatment and follow-up appointments.       MEDICATION ISSUES:    Discussed treatment plan and medication options of prescribed medication as well as the risks, benefits, any black box warnings, and  side effects including potential falls, possible impaired driving, and metabolic adversities among others, including any off label use of medication. Patient is agreeable to call the office with any worsening of symptoms or onset of side effects, or if any concerns or questions arise.  The contact information for the office is made available to the patient. They may call the [facility] at [phone]. Patient is agreeable to call 911 or go to the nearest ER should they begin having any SI/HI, or if any urgent concerns arise.        MEDS ORDERED DURING VISIT:  No orders of the defined types were placed in this encounter.      Return if symptoms worsen or fail to improve.     Treatment plan completed: 7/5/23    Progress toward goal: At goal    Functional Status: No impairment    Prognosis:  Good        This document has been electronically signed by CORRINA Harding  September 25, 2023 15:26 EDT    Some of the data in this electronic note has been brought forward from a previous encounter, any necessary changes have been made, it has been reviewed by this APRN, and it is accurate.    Please note that portions of this note were completed with a voice recognition program. Efforts were made to edit dictation, but occasionally words are mistranscribed.

## 2023-09-28 ENCOUNTER — OFFICE VISIT (OUTPATIENT)
Dept: INTERNAL MEDICINE | Age: 33
End: 2023-09-28
Payer: COMMERCIAL

## 2023-09-28 ENCOUNTER — HOSPITAL ENCOUNTER (OUTPATIENT)
Facility: HOSPITAL | Age: 33
Discharge: HOME | End: 2023-09-28
Payer: COMMERCIAL

## 2023-09-28 VITALS
TEMPERATURE: 97.5 F | HEART RATE: 107 BPM | DIASTOLIC BLOOD PRESSURE: 80 MMHG | WEIGHT: 161 LBS | SYSTOLIC BLOOD PRESSURE: 136 MMHG | BODY MASS INDEX: 26.82 KG/M2 | OXYGEN SATURATION: 95 % | HEIGHT: 65 IN

## 2023-09-28 DIAGNOSIS — R05.3 CHRONIC COUGH: Primary | ICD-10-CM

## 2023-09-28 PROCEDURE — 71046 X-RAY EXAM CHEST 2 VIEWS: CPT

## 2023-09-28 PROCEDURE — 1159F MED LIST DOCD IN RCRD: CPT

## 2023-09-28 PROCEDURE — 99213 OFFICE O/P EST LOW 20 MIN: CPT

## 2023-09-28 PROCEDURE — 1160F RVW MEDS BY RX/DR IN RCRD: CPT

## 2023-09-28 RX ORDER — GUAIFENESIN AND DEXTROMETHORPHAN HYDROBROMIDE 600; 30 MG/1; MG/1
TABLET, EXTENDED RELEASE ORAL
COMMUNITY
Start: 2023-08-01

## 2023-09-28 RX ORDER — ALBUTEROL SULFATE 90 UG/1
2 AEROSOL, METERED RESPIRATORY (INHALATION) EVERY 4 HOURS PRN
Qty: 18 G | Refills: 0 | Status: SHIPPED | OUTPATIENT
Start: 2023-09-28

## 2023-09-28 NOTE — PROGRESS NOTES
"    I N T E R N A L  M E D I C I N E  Ciarra Rosenberg, APRN    ENCOUNTER DATE:  09/28/2023    Nicko Vega / 32 y.o. / male      CHIEF COMPLAINT / REASON FOR OFFICE VISIT     Cough      ASSESSMENT & PLAN     Diagnoses and all orders for this visit:    1. Chronic cough (Primary)  -     XR Chest 2 View  -     Spirometry - Pre & Post Bronchodilator; Future  -     albuterol sulfate  (90 Base) MCG/ACT inhaler; Inhale 2 puffs Every 4 (Four) Hours As Needed for Wheezing.  Dispense: 18 g; Refill: 0  -     mometasone-formoterol (Dulera) 100-5 MCG/ACT inhaler; Inhale 2 puffs 2 (Two) Times a Day.  Dispense: 13 g; Refill: 1  -     Spacer/Aero-Hold Chamber Bags misc; Use 1 Device 2 (Two) Times a Day.  Dispense: 1 each; Refill: 0         SUMMARY/DISCUSSION  Chronic cough appears to be related to possible reactive airway/ asthma due to reports of starting with move to [facility].  Continue daily OTC antihistamine.  Will obtain Chest XR given chronic, non improving nature of symptoms and investigate further with PFT.  Start daily inhaler and will prescribe albuterol inhaler PRN.  His aunt is agreeable to provide me with a status update on his condition in a couple weeks.      Next Appointment with me: Visit date not found    Return for Next scheduled follow up.      VITAL SIGNS     Visit Vitals  /80   Pulse 107   Temp 97.5 °F (36.4 °C)   Ht 165.1 cm (65\")   Wt 73 kg (161 lb)   SpO2 95%   BMI 26.79 kg/m²             Wt Readings from Last 3 Encounters:   09/28/23 73 kg (161 lb)   08/23/23 72.6 kg (160 lb)   06/15/23 72.6 kg (160 lb)     Body mass index is 26.79 kg/m².        MEDICATIONS AT THE TIME OF OFFICE VISIT     Current Outpatient Medications on File Prior to Visit   Medication Sig Dispense Refill    Calcium-Magnesium-Vitamin D ER (Citracal Slow Release) 600- MG-MG-UNIT tablet sustained-release 24 hour       Cholecalciferol (Vitamin D-3) 25 MCG (1000 UT) capsule       fexofenadine (ALLEGRA) 180 MG tablet " Take 1 tablet by mouth Daily.      guaifenesin-dextromethorphan (MUCINEX DM)  MG tablet sustained-release 12 hour tablet       lamoTRIgine (LaMICtal) 100 MG tablet Take 1 tablet by mouth 2 (Two) Times a Day. 180 tablet 2    multivitamin with minerals tablet tablet        No current facility-administered medications on file prior to visit.        HISTORY OF PRESENT ILLNESS     Here today for a persistent, non productive cough x several months.  Patient is accompanied by his aunt, who assists with the history.      Pt's Aunt states that cough symptoms first started after moving to the [facility].  Patient does a prior history of second hand smoke exposure as an adolescent.  No prior history of asthma.  Denies any reflux symptoms.  He is taking daily Allegra, Mucinex, Tussin DM PRN, Halls cough drops PRN.  No chest pain, tightness, wheezing, shortness of breath, fever, chills, night sweats.        Patient Care Team:  Ciarra Rosenberg APRN as PCP - General (Family Medicine)  Chadd Parry MD as Consulting Physician (Otolaryngology)  Nixon Santos MD as Consulting Physician (Neurology)    REVIEW OF SYSTEMS     Review of Systems   Constitutional:  Negative for chills, fever and unexpected weight change.   HENT:  Negative for ear pain, postnasal drip, rhinorrhea and sore throat.    Respiratory:  Positive for cough. Negative for chest tightness, shortness of breath and wheezing.    Cardiovascular:  Negative for chest pain, palpitations and leg swelling.   Musculoskeletal:  Negative for myalgias.   Skin:  Negative for rash.   Neurological:  Negative for dizziness, weakness, light-headedness and headaches.   Psychiatric/Behavioral:  The patient is not nervous/anxious.         PHYSICAL EXAMINATION     Physical Exam  Vitals reviewed.   Constitutional:       General: He is not in acute distress.     Appearance: Normal appearance. He is not ill-appearing, toxic-appearing or diaphoretic.   HENT:      Head:  Normocephalic and atraumatic.   Cardiovascular:      Rate and Rhythm: Normal rate and regular rhythm.      Heart sounds: Normal heart sounds.   Pulmonary:      Effort: Pulmonary effort is normal.      Breath sounds: Normal breath sounds.   Neurological:      Mental Status: He is alert and oriented to person, place, and time. Mental status is at baseline.   Psychiatric:         Mood and Affect: Mood normal.         Behavior: Behavior normal.         Thought Content: Thought content normal.         Judgment: Judgment normal.         REVIEWED DATA     Labs:           Imaging:            Medical Tests:           Summary of old records / correspondence / consultant report:           Request outside records:

## 2023-09-29 RX ORDER — MOMETASONE FUROATE AND FORMOTEROL FUMARATE DIHYDRATE 100; 5 UG/1; UG/1
2 AEROSOL RESPIRATORY (INHALATION)
Qty: 13 G | Refills: 1 | Status: SHIPPED | OUTPATIENT
Start: 2023-09-29

## 2023-10-13 DIAGNOSIS — R05.3 CHRONIC COUGH: Primary | ICD-10-CM

## 2023-10-13 RX ORDER — CICLESONIDE 80 UG/1
1 AEROSOL, METERED RESPIRATORY (INHALATION)
Qty: 6.1 G | Refills: 1 | Status: SHIPPED | OUTPATIENT
Start: 2023-10-13

## 2023-10-23 ENCOUNTER — HOSPITAL ENCOUNTER (OUTPATIENT)
Dept: RESPIRATORY THERAPY | Facility: HOSPITAL | Age: 33
Discharge: HOME | End: 2023-10-23
Payer: COMMERCIAL

## 2023-10-23 DIAGNOSIS — R05.3 CHRONIC COUGH: ICD-10-CM

## 2023-10-23 PROCEDURE — 94060 EVALUATION OF WHEEZING: CPT

## 2023-10-23 RX ORDER — ALBUTEROL SULFATE 2.5 MG/3ML
2.5 SOLUTION RESPIRATORY (INHALATION) ONCE AS NEEDED
Status: COMPLETED | OUTPATIENT
Start: 2023-10-23 | End: 2023-10-23

## 2023-10-23 RX ADMIN — ALBUTEROL SULFATE 2.5 MG: 2.5 SOLUTION RESPIRATORY (INHALATION) at 14:15

## 2023-10-25 DIAGNOSIS — R05.3 CHRONIC COUGH: Primary | ICD-10-CM

## 2023-10-25 RX ORDER — FLUTICASONE PROPIONATE 44 UG/1
2 AEROSOL, METERED RESPIRATORY (INHALATION)
Qty: 10.6 G | Refills: 0 | Status: SHIPPED | OUTPATIENT
Start: 2023-10-25

## 2023-11-13 DIAGNOSIS — R05.3 CHRONIC COUGH: Primary | ICD-10-CM

## 2024-01-24 ENCOUNTER — OFFICE VISIT (OUTPATIENT)
Dept: NEUROLOGY | Facility: CLINIC | Age: 34
End: 2024-01-24
Payer: COMMERCIAL

## 2024-01-24 VITALS
SYSTOLIC BLOOD PRESSURE: 118 MMHG | WEIGHT: 160 LBS | HEIGHT: 65 IN | DIASTOLIC BLOOD PRESSURE: 72 MMHG | OXYGEN SATURATION: 97 % | BODY MASS INDEX: 26.66 KG/M2 | HEART RATE: 121 BPM

## 2024-01-24 DIAGNOSIS — G40.309 GENERALIZED IDIOPATHIC EPILEPSY, NOT INTRACTABLE, WITHOUT STATUS EPILEPTICUS: ICD-10-CM

## 2024-01-24 PROCEDURE — 1159F MED LIST DOCD IN RCRD: CPT | Performed by: STUDENT IN AN ORGANIZED HEALTH CARE EDUCATION/TRAINING PROGRAM

## 2024-01-24 PROCEDURE — 1160F RVW MEDS BY RX/DR IN RCRD: CPT | Performed by: STUDENT IN AN ORGANIZED HEALTH CARE EDUCATION/TRAINING PROGRAM

## 2024-01-24 PROCEDURE — 99214 OFFICE O/P EST MOD 30 MIN: CPT | Performed by: STUDENT IN AN ORGANIZED HEALTH CARE EDUCATION/TRAINING PROGRAM

## 2024-01-24 RX ORDER — LAMOTRIGINE 150 MG/1
150 TABLET ORAL 2 TIMES DAILY
Qty: 60 TABLET | Refills: 11 | Status: SHIPPED | OUTPATIENT
Start: 2024-01-24

## 2024-01-24 NOTE — PROGRESS NOTES
Chief Complaint   Patient presents with    Seizures       Patient ID: Nicko Vega is a 33 y.o. male.    HPI:    The following portions of the patient's history were reviewed and updated as appropriate: allergies, current medications, past family history, past medical history, past social history, past surgical history and problem list.    Interval history:    Review of Systems   Eyes:  Positive for photophobia and visual disturbance.   Neurological:  Positive for seizures. Negative for dizziness, tremors, syncope, facial asymmetry, speech difficulty, weakness, light-headedness, numbness and headaches.      Mr. Vega is a 32-year-old male with a history of seizures presenting as a new patient referred by CORRINA Way.  He was seen by an epilepsy clinic in [place].  His last visit around that time per the notes was January 2022 he was on Depakote in the past but this was discontinued and he takes lamotrigine 100 twice daily.  He had May 2021 blood work which indicated elevated but improved liver function tests.  He had ultrasound of his liver and was diagnosed with fatty liver disease.  He implemented lifestyle and dietary changes that led to 33 pounds of weight loss and normal LFTs.  He has had normal therapeutic range lamotrigine in the past.  He reportedly has not had side effects on lamotrigine and is good about taking medications and he did not have seizures in a year and his last seizure was documented as 2018.  In terms of his past epilepsy history he had a tonic-clonic seizure when he was 13 and was started on Depakote he had febrile seizures as a toddler and absence seizure's through childhood his EEG in 2007 demonstrated occasional sharp waves from the frontal region predominantly right frontal.  Repeat EEG showed absence seizure's characterized by spacing out and triggered by hyperventilation.  It was thought in the past that he had absent seizures and generalized epilepsy but one of the  neurologist suspected that he had focal seizures.  He had an MRI of the brain which showed diminutive ocular globes bilaterally with no definite mesial temporal abnormality no definite evidence of neuronal migration abnormally gray matter heterotopia or cortical dysplasia.  He was switched from Depakote to oxcarbazepine and had a small seizure and had mood changes and Depakote was resumed and oxcarbazepine dosage was reduced and I assume later stopped as he is not on it now.  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  He has had various spell types 1 of which is characterized by generalized tonic-clonic seizures with an aura of sometimes having the feeling of the sound of a waterfall and/or headache and dizziness prior to seizure.  Postictally he is confused with these.  In the past he has had absence seizures staring and spacing out with an aura of feeling the sound of waterfall.  Triggers can include hyperventilation he lives with his and.  He has had elevated LFTs in the past and lamotrigine level within therapeutic range in the past reportedly.  His liver function enzymes were normal with his last check.  His lamotrigine level was 6.8 3 months ago and his CBC was normal.  Age of onset of seizure was 2003. Most of what he has had are that he goes into a trance, bubbles at the mouth. He confirms that he has a waterfall sound prior to having a seizure, shortly before. Last in 2018 in setting of oxcarbazepine. Per EMR, hyperventilation can be a trigger. Aunt notes stress can be a seizure. College can be overwhelming.  Aunt notes a shoulder twitch at times. Enjoys theater and musicals. No seizures, no clear side effects on lamotrigine. Photosensitive but unable to see. Has hearing impairment.      Risk factors  Birth - on time, no known complications  Development - Asperger's syndrome and retinitis pigmentosa.  "Graduated with a theater degree  CNS infection - none  Has history of retinitis pigmentosa.  Head injury- none  Family history - none known of sz but don't know genetics of biological father     Prior meds VPA, OXC  Current meds  BID    Since last visit he had several breakthrough seizures. Which is the first since being on LTG. 23 - possible absence seizure in evening, lasted 3-4 minutes  10/23/23 - PFT had a 3 minute \"absence\" seizure. Heavy breathing was involved.   - thinks he may have had a sz while finishing a late meal, and in the last the week he reported a few other occurrences to his aunt. But she was sitting across and didn't see it.    24 - had a seizure    One event 24    Not the typical post event disorientation, but typical event with drooling at times.   HR runs fast at baseline.      Vitals:    24 1619   BP: 118/72   Pulse: (!) 121   SpO2: 97%       Neurologic Exam     Mental Status   Speech: speech is normal   Level of consciousness: alert    Cranial Nerves     CN III, IV, VI   Extraocular motions are normal.     CN V   Facial sensation intact.     CN VII   Facial expression full, symmetric.     CN VIII   Hearing: impaired    CN IX, X   Palate: symmetric    CN XI   Right trapezius strength: normal  Left trapezius strength: normal    CN XII   Tongue: not atrophic  Fasciculations: absent  Tongue deviation: none  Hard to tell pupil reaction due to eye movements but pt notes when light passes on eyes     Motor Exam   Muscle bulk: normal    Strength   Right deltoid: 5/5  Left deltoid: 5/5  Right biceps: 5/5  Left biceps: 5/5  Right triceps: 5/5  Left triceps: 5/5  Right iliopsoas: 5/5  Left iliopsoas: 5/5  Right quadriceps: 5/5  Left quadriceps: 5/5  Right hamstrin/5  Left hamstrin/5  Right anterior tibial: 5/5  Left anterior tibial: 5/5  Right gastroc: 5/5  Left gastroc: 5/5Grip 5 out of 5 bilaterally     Sensory Exam   Right arm light touch: " normal  Left arm light touch: normal  Right leg light touch: normal  Left leg light touch: normal    Gait, Coordination, and Reflexes     Coordination   Finger to nose coordination: normal  Heel to shin coordination: normal      Physical Exam  Eyes:      Extraocular Movements: EOM normal.   Neurological:      Coordination: Finger-Nose-Finger Test and Heel to Shin Test normal.   Psychiatric:         Speech: Speech normal.         Procedures    Assessment/Plan:    The patient has epilepsy based on clinical history and testing. Increase lamotrigine 100 mg twice daily to 150 BID for events concerning for breakthrough; a significant portion could be triggered from HV and illness.  Seizure precautions discussed.  The patient is blind and does not drive.  He has had elevated LFTs in the past but recent labs reviewed and no large abnormalities.  -The patient has epilepsy which is a chronic condition that poses a threat to life or serious harm  -Prescription medications are managed in this visit  -discussed LTG can be associated with heart rhythm issues                 Diagnoses and all orders for this visit:    1. Generalized idiopathic epilepsy, not intractable, without status epilepticus  -     lamoTRIgine (LaMICtal) 150 MG tablet; Take 1 tablet by mouth 2 (Two) Times a Day.  Dispense: 60 tablet; Refill: 11           Nixon Santos MD

## 2024-01-24 NOTE — LETTER
January 24, 2024       No Recipients    Patient: Nicko Vega   YOB: 1990   Date of Visit: 1/24/2024     Dear CORRINA Way:       Thank you for referring Nicko Vega to me for evaluation. Below are the relevant portions of my assessment and plan of care.    If you have questions, please do not hesitate to call me. I look forward to following Nicko along with you.         Sincerely,        Nixon Santos MD        CC:   No Recipients

## 2024-02-01 ENCOUNTER — OFFICE VISIT (OUTPATIENT)
Dept: INTERNAL MEDICINE | Age: 34
End: 2024-02-01
Payer: COMMERCIAL

## 2024-02-01 VITALS
DIASTOLIC BLOOD PRESSURE: 80 MMHG | HEART RATE: 108 BPM | TEMPERATURE: 97.5 F | HEIGHT: 65 IN | SYSTOLIC BLOOD PRESSURE: 130 MMHG | OXYGEN SATURATION: 98 % | WEIGHT: 160 LBS | BODY MASS INDEX: 26.66 KG/M2

## 2024-02-01 DIAGNOSIS — H81.10 BENIGN PAROXYSMAL POSITIONAL VERTIGO, UNSPECIFIED LATERALITY: Primary | ICD-10-CM

## 2024-02-01 DIAGNOSIS — J01.10 ACUTE NON-RECURRENT FRONTAL SINUSITIS: ICD-10-CM

## 2024-02-01 PROCEDURE — 1159F MED LIST DOCD IN RCRD: CPT

## 2024-02-01 PROCEDURE — 1160F RVW MEDS BY RX/DR IN RCRD: CPT

## 2024-02-01 PROCEDURE — 99214 OFFICE O/P EST MOD 30 MIN: CPT

## 2024-02-01 RX ORDER — METHYLPREDNISOLONE 4 MG/1
TABLET ORAL
Qty: 21 TABLET | Refills: 0 | Status: SHIPPED | OUTPATIENT
Start: 2024-02-01

## 2024-02-01 RX ORDER — DOXYCYCLINE HYCLATE 100 MG/1
100 CAPSULE ORAL 2 TIMES DAILY
Qty: 14 CAPSULE | Refills: 0 | Status: SHIPPED | OUTPATIENT
Start: 2024-02-01 | End: 2024-02-08

## 2024-02-01 RX ORDER — MECLIZINE HYDROCHLORIDE 25 MG/1
25 TABLET ORAL 3 TIMES DAILY PRN
Qty: 30 TABLET | Refills: 0 | Status: SHIPPED | OUTPATIENT
Start: 2024-02-01

## 2024-02-01 NOTE — PROGRESS NOTES
"    I N T E R N A L  M E D I C I N E  Ciarra Rosenberg, APRN    ENCOUNTER DATE:  02/01/2024    Nicko Vega / 33 y.o. / male      CHIEF COMPLAINT / REASON FOR OFFICE VISIT     Dizziness and Ear Fullness      ASSESSMENT & PLAN     Diagnoses and all orders for this visit:    1. Benign paroxysmal positional vertigo, unspecified laterality (Primary)  -     Cancel: Ambulatory Referral to Physical Therapy Evaluate and treat, Vestibular  -     meclizine (ANTIVERT) 25 MG tablet; Take 1 tablet by mouth 3 (Three) Times a Day As Needed for Dizziness or Nausea.  Dispense: 30 tablet; Refill: 0  -     Ambulatory Referral to Physical Therapy Evaluate and treat, Vestibular    2. Acute non-recurrent frontal sinusitis  -     methylPREDNISolone (MEDROL) 4 MG dose pack; Take as directed on package instructions.  Dispense: 21 tablet; Refill: 0  -     doxycycline (VIBRAMYCIN) 100 MG capsule; Take 1 capsule by mouth 2 (Two) Times a Day for 7 days.  Dispense: 14 capsule; Refill: 0         SUMMARY/DISCUSSION  History consistent with likely BPPV and he is without neurological deficits on today's physical exam.  Recommend Vestibular PT, Meclizine PRN.  Will treat sinusitis with doxycycline given PCN allergy and steroid dose pack given bilateral ear effusions.    Patient's family member is agreeable to provide me an update on patient's condition in a few days.        Next Appointment with me: 6/20/2024    Return for Next scheduled follow up.      VITAL SIGNS     Visit Vitals  /80   Pulse 108   Temp 97.5 °F (36.4 °C)   Ht 165.1 cm (65\")   Wt 72.6 kg (160 lb)   SpO2 98%   BMI 26.63 kg/m²             Wt Readings from Last 3 Encounters:   02/01/24 72.6 kg (160 lb)   01/24/24 72.6 kg (160 lb)   09/28/23 73 kg (161 lb)     Body mass index is 26.63 kg/m².        MEDICATIONS AT THE TIME OF OFFICE VISIT     Current Outpatient Medications on File Prior to Visit   Medication Sig Dispense Refill    albuterol sulfate  (90 Base) MCG/ACT " "inhaler Inhale 2 puffs Every 4 (Four) Hours As Needed for Wheezing. 18 g 0    Calcium-Magnesium-Vitamin D ER (Citracal Slow Release) 600- MG-MG-UNIT tablet sustained-release 24 hour       Cholecalciferol (Vitamin D-3) 25 MCG (1000 UT) capsule       fexofenadine (ALLEGRA) 180 MG tablet Take 1 tablet by mouth Daily.      guaifenesin-dextromethorphan (MUCINEX DM)  MG tablet sustained-release 12 hour tablet       lamoTRIgine (LaMICtal) 150 MG tablet Take 1 tablet by mouth 2 (Two) Times a Day. 60 tablet 11    multivitamin with minerals tablet tablet        No current facility-administered medications on file prior to visit.        HISTORY OF PRESENT ILLNESS     Here with recurrent dizziness symptoms since Franco time 2023.  Symptoms are exacerbated by head turning and position changes.  Describes sensation as feeling as if he is \"rocking on a boat.\"  Associated with nausea but no vomiting.  No true headache, but did recently have URI in the last few weeks and now with continued frontal sinus pressure/ pain, nasal congestion, bilateral ear pressure.  Patient wears bilateral hearing aids for known sensorineural hearing loss of both ears.  Also with retinitis pigmentosa of both eyes.  Denies any numbness, tingling, weakness, chest pain, dyspnea.      Recently saw neurology, Dr. Santos, on January 24, 2024 and lamotrigine was increased from 100 mg to 150 mg BID.  He started dosage increase on January 25, 2024.  Dizziness symptoms were present before recent dosage increase of lamotrigine.      Patient Care Team:  Ciarra Rosenberg APRN as PCP - General (Family Medicine)  Chadd Parry MD as Consulting Physician (Otolaryngology)  Nixon Santos MD as Consulting Physician (Neurology)    REVIEW OF SYSTEMS     Review of Systems   Constitutional:  Negative for chills, fever and unexpected weight change.   HENT:  Positive for congestion, sinus pressure and sinus pain.    Respiratory:  Negative for cough, chest " tightness and shortness of breath.    Cardiovascular:  Negative for chest pain, palpitations and leg swelling.   Gastrointestinal:  Positive for nausea. Negative for abdominal pain and vomiting.   Neurological:  Positive for dizziness. Negative for weakness, light-headedness and headaches.   Psychiatric/Behavioral:  The patient is not nervous/anxious.           PHYSICAL EXAMINATION     Physical Exam  Vitals reviewed.   Constitutional:       General: He is not in acute distress.     Appearance: Normal appearance. He is not ill-appearing, toxic-appearing or diaphoretic.   HENT:      Head: Normocephalic and atraumatic.      Right Ear: Ear canal and external ear normal. A middle ear effusion is present. There is no impacted cerumen.      Left Ear: Ear canal and external ear normal. A middle ear effusion is present. There is no impacted cerumen.      Nose: Congestion present.      Right Sinus: Frontal sinus tenderness present. No maxillary sinus tenderness.      Left Sinus: Frontal sinus tenderness present. No maxillary sinus tenderness.   Cardiovascular:      Rate and Rhythm: Normal rate and regular rhythm.      Heart sounds: Normal heart sounds.   Pulmonary:      Effort: Pulmonary effort is normal.      Breath sounds: Normal breath sounds.   Neurological:      Mental Status: He is alert and oriented to person, place, and time. Mental status is at baseline.   Psychiatric:         Mood and Affect: Mood normal.         Behavior: Behavior normal.         Thought Content: Thought content normal.         Judgment: Judgment normal.           REVIEWED DATA     Labs:           Imaging:            Medical Tests:           Summary of old records / correspondence / consultant report:           Request outside records:

## 2024-02-15 ENCOUNTER — HOSPITAL ENCOUNTER (OUTPATIENT)
Dept: PHYSICAL THERAPY | Facility: HOSPITAL | Age: 34
Setting detail: THERAPIES SERIES
Discharge: HOME | End: 2024-02-15
Payer: COMMERCIAL

## 2024-02-15 DIAGNOSIS — H81.10 BENIGN PAROXYSMAL POSITIONAL VERTIGO, UNSPECIFIED LATERALITY: Primary | ICD-10-CM

## 2024-02-15 DIAGNOSIS — R42 VERTIGO: ICD-10-CM

## 2024-02-15 PROCEDURE — 97162 PT EVAL MOD COMPLEX 30 MIN: CPT

## 2024-02-15 PROCEDURE — 95992 CANALITH REPOSITIONING PROC: CPT

## 2024-02-29 ENCOUNTER — HOSPITAL ENCOUNTER (OUTPATIENT)
Dept: PHYSICAL THERAPY | Facility: HOSPITAL | Age: 34
Setting detail: THERAPIES SERIES
Discharge: HOME | End: 2024-02-29
Payer: COMMERCIAL

## 2024-02-29 DIAGNOSIS — R42 VERTIGO: ICD-10-CM

## 2024-02-29 DIAGNOSIS — H81.10 BENIGN PAROXYSMAL POSITIONAL VERTIGO, UNSPECIFIED LATERALITY: Primary | ICD-10-CM

## 2024-02-29 PROCEDURE — 97535 SELF CARE MNGMENT TRAINING: CPT

## 2024-02-29 NOTE — THERAPY TREATMENT NOTE
Outpatient Physical Therapy Vestibular Treatment Note  [facility]     Patient Name: Nicko Vega  : 1990  MRN: [id number]  Today's Date: 2024      Visit Date: 2024    Visit Dx:     ICD-10-CM ICD-9-CM   1. Benign paroxysmal positional vertigo, unspecified laterality  H81.10 386.11   2. Vertigo  R42 780.4       Patient Active Problem List   Diagnosis    Attention deficit hyperactivity disorder (ADHD)    Pervasive developmental disorder, active    Epilepsy    Expressive language disorder    Generalized idiopathic epilepsy, not intractable, without status epilepticus    Insomnia    Retinitis pigmentosa of both eyes    Seizure    Sensorineural hearing loss (SNHL) of both ears        Vestibular Eval       Row Name 24 1500             Positional Testing    Sarah-Hallpike Right --  unchanged from eval - symptoms inconsistent and nystagmus at baseline 2' visual impairment  -MP      Sharon-Hallpike Left --  unchanged from eval - symptoms inconsistent and nystagmus at baseline 2' visual impairment  -MP                User Key  (r) = Recorded By, (t) = Taken By, (c) = Cosigned By      Initials Name Provider Type    Gladys Liang, PT Physical Therapist                                 PT Assessment/Plan       Row Name 24 1400          PT Assessment    Assessment Comments CJ returns to clinic with no change in symptoms, continues to require rwx for stability when walking. At baseline pt. without balance deficits. No change in symptoms in DixHallpike from evaluation. Discussed realm of vestibular PT and role/POC, discussed if balance is not primary concern potentially f/u with PCP re: additional assessment for vertigo and if continues to require use of rwx may return for balance/reactive strategies.  -MP        PT Plan    PT Plan Comments reassess PRN  -MP               User Key  (r) = Recorded By, (t) = Taken By, (c) = Cosigned By      Initials Name Provider Type    Gladys Liang,  PT Physical Therapist                        OP Exercises       Row Name 02/29/24 1400             Subjective    Subjective Comments My dizziness has been about the same, still needs walker because he feels off balance.  -MP                User Key  (r) = Recorded By, (t) = Taken By, (c) = Cosigned By      Initials Name Provider Type    Gladys Liang PT Physical Therapist                                 PT OP Goals       Row Name 02/29/24 1400          PT Short Term Goals    STG Date to Achieve 03/16/24  -MP     STG 1 Pt. will be asymptomatic with supine <> sit to improve safety with transitional movements.  -MP     STG 1 Progress Ongoing  -MP     STG 2 Pt. will demonstrate proper seated balance without posterior lean to improve safety with ADLs.  -MP     STG 2 Progress Ongoing  -MP        Long Term Goals    LTG Date to Achieve 04/15/24  -MP     LTG 1 Pt. will socre </= 18 on DHI to indicate improved perception of disability.  -MP     LTG 1 Progress Ongoing  -MP     LTG 2 Pt. will ambulate without LOB due to dizziness to resume previous level of function.  -MP     LTG 2 Progress Ongoing  -MP               User Key  (r) = Recorded By, (t) = Taken By, (c) = Cosigned By      Initials Name Provider Type    Gladys Liang PT Physical Therapist                    Therapy Education  Education Details: Role of PT; 3 components of balance system including vision and vestibular and limited treatment approach based on visual deficit; balance is not pt. primary concern - discussed return to PCP to discuss further testing?  Given: Symptoms/condition management  Program: Reinforced  How Provided: Verbal  Provided to: Patient, Caregiver (Clare - pt. aunt)  Level of Understanding: Verbalized  26939 - PT Self Care/Mgmt Minutes: 10              Time Calculation:   Start Time: 1430  Stop Time: 1445  Time Calculation (min): 15 min  Total Timed Code Minutes- PT: 10 minute(s)  Timed Charges  25630 - PT Self Care/Mgmt  Minutes: 10  Total Minutes  Timed Charges Total Minutes: 10   Total Minutes: 10   Therapy Charges for Today       Code Description Service Date Service Provider Modifiers Qty    92977090444 HC PT SELF CARE/MGMT/TRAIN EA 15 MIN 2/29/2024 Gladys Wright, PT GP 1                     Gladys Wright, PT  2/29/2024

## 2024-03-13 ENCOUNTER — TELEPHONE (OUTPATIENT)
Dept: NEUROLOGY | Facility: CLINIC | Age: 34
End: 2024-03-13
Payer: COMMERCIAL

## 2024-03-13 NOTE — TELEPHONE ENCOUNTER
Left vm, MyChart (if applicable) & mailed reminder regarding appt reschedule due to provider being out of office.  Had scheduled pt for May 13th.

## 2024-05-13 ENCOUNTER — OFFICE VISIT (OUTPATIENT)
Dept: NEUROLOGY | Facility: CLINIC | Age: 34
End: 2024-05-13
Payer: COMMERCIAL

## 2024-05-13 ENCOUNTER — LAB (OUTPATIENT)
Dept: LAB | Facility: HOSPITAL | Age: 34
End: 2024-05-13
Payer: COMMERCIAL

## 2024-05-13 VITALS
BODY MASS INDEX: 25.83 KG/M2 | HEART RATE: 102 BPM | WEIGHT: 155 LBS | SYSTOLIC BLOOD PRESSURE: 128 MMHG | HEIGHT: 65 IN | OXYGEN SATURATION: 97 % | DIASTOLIC BLOOD PRESSURE: 74 MMHG

## 2024-05-13 DIAGNOSIS — G40.309 GENERALIZED IDIOPATHIC EPILEPSY, NOT INTRACTABLE, WITHOUT STATUS EPILEPTICUS: Primary | ICD-10-CM

## 2024-05-13 DIAGNOSIS — G40.309 GENERALIZED IDIOPATHIC EPILEPSY, NOT INTRACTABLE, WITHOUT STATUS EPILEPTICUS: ICD-10-CM

## 2024-05-13 LAB
ALBUMIN SERPL-MCNC: 4.6 G/DL (ref 3.5–5.2)
ALBUMIN/GLOB SERPL: 1.5 G/DL
ALP SERPL-CCNC: 52 U/L (ref 39–117)
ALT SERPL W P-5'-P-CCNC: 27 U/L (ref 1–41)
ANION GAP SERPL CALCULATED.3IONS-SCNC: 7.2 MMOL/L (ref 5–15)
AST SERPL-CCNC: 23 U/L (ref 1–40)
BASOPHILS # BLD AUTO: 0.07 10*3/MM3 (ref 0–0.2)
BASOPHILS NFR BLD AUTO: 0.8 % (ref 0–1.5)
BILIRUB SERPL-MCNC: 0.5 MG/DL (ref 0–1.2)
BUN SERPL-MCNC: 11 MG/DL (ref 6–20)
BUN/CREAT SERPL: 13.1 (ref 7–25)
CALCIUM SPEC-SCNC: 9.9 MG/DL (ref 8.6–10.5)
CHLORIDE SERPL-SCNC: 105 MMOL/L (ref 98–107)
CO2 SERPL-SCNC: 30.8 MMOL/L (ref 22–29)
CREAT SERPL-MCNC: 0.84 MG/DL (ref 0.76–1.27)
DEPRECATED RDW RBC AUTO: 38.4 FL (ref 37–54)
EGFRCR SERPLBLD CKD-EPI 2021: 118.1 ML/MIN/1.73
EOSINOPHIL # BLD AUTO: 0.25 10*3/MM3 (ref 0–0.4)
EOSINOPHIL NFR BLD AUTO: 3 % (ref 0.3–6.2)
ERYTHROCYTE [DISTWIDTH] IN BLOOD BY AUTOMATED COUNT: 12 % (ref 12.3–15.4)
GLOBULIN UR ELPH-MCNC: 3.1 GM/DL
GLUCOSE SERPL-MCNC: 90 MG/DL (ref 65–99)
HCT VFR BLD AUTO: 45.2 % (ref 37.5–51)
HGB BLD-MCNC: 15.2 G/DL (ref 13–17.7)
IMM GRANULOCYTES # BLD AUTO: 0.02 10*3/MM3 (ref 0–0.05)
IMM GRANULOCYTES NFR BLD AUTO: 0.2 % (ref 0–0.5)
LYMPHOCYTES # BLD AUTO: 2.44 10*3/MM3 (ref 0.7–3.1)
LYMPHOCYTES NFR BLD AUTO: 29 % (ref 19.6–45.3)
MCH RBC QN AUTO: 29.5 PG (ref 26.6–33)
MCHC RBC AUTO-ENTMCNC: 33.6 G/DL (ref 31.5–35.7)
MCV RBC AUTO: 87.6 FL (ref 79–97)
MONOCYTES # BLD AUTO: 0.67 10*3/MM3 (ref 0.1–0.9)
MONOCYTES NFR BLD AUTO: 8 % (ref 5–12)
NEUTROPHILS NFR BLD AUTO: 4.97 10*3/MM3 (ref 1.7–7)
NEUTROPHILS NFR BLD AUTO: 59 % (ref 42.7–76)
NRBC BLD AUTO-RTO: 0 /100 WBC (ref 0–0.2)
PLATELET # BLD AUTO: 319 10*3/MM3 (ref 140–450)
PMV BLD AUTO: 9.8 FL (ref 6–12)
POTASSIUM SERPL-SCNC: 4.1 MMOL/L (ref 3.5–5.2)
PROT SERPL-MCNC: 7.7 G/DL (ref 6–8.5)
RBC # BLD AUTO: 5.16 10*6/MM3 (ref 4.14–5.8)
SODIUM SERPL-SCNC: 143 MMOL/L (ref 136–145)
TSH SERPL DL<=0.05 MIU/L-ACNC: 1.63 UIU/ML (ref 0.27–4.2)
WBC NRBC COR # BLD AUTO: 8.42 10*3/MM3 (ref 3.4–10.8)

## 2024-05-13 PROCEDURE — 80050 GENERAL HEALTH PANEL: CPT

## 2024-05-13 PROCEDURE — 80175 DRUG SCREEN QUAN LAMOTRIGINE: CPT

## 2024-05-13 PROCEDURE — 99214 OFFICE O/P EST MOD 30 MIN: CPT | Performed by: STUDENT IN AN ORGANIZED HEALTH CARE EDUCATION/TRAINING PROGRAM

## 2024-05-13 PROCEDURE — 1159F MED LIST DOCD IN RCRD: CPT | Performed by: STUDENT IN AN ORGANIZED HEALTH CARE EDUCATION/TRAINING PROGRAM

## 2024-05-13 PROCEDURE — 1160F RVW MEDS BY RX/DR IN RCRD: CPT | Performed by: STUDENT IN AN ORGANIZED HEALTH CARE EDUCATION/TRAINING PROGRAM

## 2024-05-13 PROCEDURE — 36415 COLL VENOUS BLD VENIPUNCTURE: CPT

## 2024-05-13 RX ORDER — LAMOTRIGINE 150 MG/1
150 TABLET ORAL 2 TIMES DAILY
Qty: 180 TABLET | Refills: 3 | Status: SHIPPED | OUTPATIENT
Start: 2024-05-13

## 2024-05-13 NOTE — PROGRESS NOTES
Chief Complaint   Patient presents with    Seizures       Patient ID: Nicko Vega is a 33 y.o. male.    HPI:    The following portions of the patient's history were reviewed and updated as appropriate: allergies, current medications, past family history, past medical history, past social history, past surgical history and problem list.    Interval history:    Review of Systems   Eyes:  Positive for visual disturbance.   Neurological:  Positive for seizures (1 since last visit). Negative for dizziness, tremors, syncope, facial asymmetry, speech difficulty, weakness, light-headedness, numbness and headaches.      Had two episodes concerning for seizure in late Jan early Feb within a couple of days. He had treatment for BPPV for dizziness. No seizure since last visit after LTG 150mg BID. Tolerating medication.      Vitals:    05/13/24 1508   BP: 128/74   Pulse: 102   SpO2: 97%       Neurologic Exam    Physical Exam    Procedures    Assessment/Plan:    The patient has epilepsy based on clinical history and testing. Increase lamotrigine 100 mg twice daily to 150 BID for events concerning for breakthrough; a significant portion could be triggered from HV and illness.  Seizure precautions discussed.  The patient is blind and does not drive.  He has had elevated LFTs in the past but recent labs reviewed and no large abnormalities. No sz since increase.  -The patient has epilepsy which is a chronic condition that poses a threat to life or serious harm  -Prescription medications are managed in this visit  -discussed LTG can be associated with heart rhythm issues               Diagnoses and all orders for this visit:    1. Generalized idiopathic epilepsy, not intractable, without status epilepticus (Primary)  -     Comprehensive Metabolic Panel  -     CBC & Differential; Future  -     Lamotrigine Level; Future  -     TSH Rfx On Abnormal To Free T4  -     lamoTRIgine (LaMICtal) 150 MG tablet; Take 1 tablet by mouth 2  (Two) Times a Day.  Dispense: 180 tablet; Refill: 3           Nixon Santos MD

## 2024-05-13 NOTE — LETTER
May 13, 2024       No Recipients    Patient: Nicko Vega   YOB: 1990   Date of Visit: 5/13/2024     Dear CORRINA Way:       Thank you for referring Nicko Vega to me for evaluation. Below are the relevant portions of my assessment and plan of care.    If you have questions, please do not hesitate to call me. I look forward to following Nicko along with you.         Sincerely,        Nixon Santos MD        CC:   No Recipients

## 2024-05-15 LAB — LAMOTRIGINE SERPL-MCNC: 10.7 UG/ML (ref 2–20)

## 2024-06-20 ENCOUNTER — OFFICE VISIT (OUTPATIENT)
Dept: INTERNAL MEDICINE | Age: 34
End: 2024-06-20
Payer: COMMERCIAL

## 2024-06-20 VITALS
SYSTOLIC BLOOD PRESSURE: 110 MMHG | HEIGHT: 65 IN | TEMPERATURE: 98.2 F | DIASTOLIC BLOOD PRESSURE: 84 MMHG | WEIGHT: 157 LBS | OXYGEN SATURATION: 98 % | BODY MASS INDEX: 26.16 KG/M2 | HEART RATE: 100 BPM | RESPIRATION RATE: 18 BRPM

## 2024-06-20 DIAGNOSIS — Z00.00 ANNUAL PHYSICAL EXAM: Primary | ICD-10-CM

## 2024-06-20 PROCEDURE — 1159F MED LIST DOCD IN RCRD: CPT

## 2024-06-20 PROCEDURE — 2014F MENTAL STATUS ASSESS: CPT

## 2024-06-20 PROCEDURE — 1160F RVW MEDS BY RX/DR IN RCRD: CPT

## 2024-06-20 PROCEDURE — 99395 PREV VISIT EST AGE 18-39: CPT

## 2024-06-20 NOTE — PROGRESS NOTES
"    I N T E R N A L  M E D I C I N E  Ciarra Chet, APRN      ENCOUNTER DATE:  06/20/2024    Nicko Vega / 33 y.o. / male    CHIEF COMPLAINT     Annual Exam    Accompanied by his aunt to today's visit.  Aunt provides assistance with today's history.     Allergic rhinitis: Symptoms well controlled on Allegra.       Formerly followed by psychiatry, Haley Riddle, CORR[facility], Aasperger's/ ADHD history.  No longer taking escitalopram.  Mood is stable.       Epilepsy/ seizure:  Followed by neurology, Dr. Santos.  Well controlled on lamotrigine 100 mg BID.  Last seizure was reportedly in 2019.   Next appointment is September 16, 2024.    VITALS     Visit Vitals  /84   Pulse 100   Temp 98.2 °F (36.8 °C)   Resp 18   Ht 165.1 cm (65\")   Wt 71.2 kg (157 lb)   SpO2 98%   BMI 26.13 kg/m²       BP Readings from Last 3 Encounters:   06/20/24 110/84   05/13/24 128/74   02/01/24 130/80     Wt Readings from Last 3 Encounters:   06/20/24 71.2 kg (157 lb)   05/13/24 70.3 kg (155 lb)   02/01/24 72.6 kg (160 lb)      Body mass index is 26.13 kg/m².      MEDICATIONS     Current Outpatient Medications on File Prior to Visit   Medication Sig Dispense Refill    Calcium-Magnesium-Vitamin D ER (Citracal Slow Release) 600- MG-MG-UNIT tablet sustained-release 24 hour       Cholecalciferol (Vitamin D-3) 25 MCG (1000 UT) capsule       fexofenadine (ALLEGRA) 180 MG tablet Take 1 tablet by mouth Daily.      guaifenesin-dextromethorphan (MUCINEX DM)  MG tablet sustained-release 12 hour tablet       lamoTRIgine (LaMICtal) 150 MG tablet Take 1 tablet by mouth 2 (Two) Times a Day. 180 tablet 3    multivitamin with minerals tablet tablet       [DISCONTINUED] albuterol sulfate  (90 Base) MCG/ACT inhaler Inhale 2 puffs Every 4 (Four) Hours As Needed for Wheezing. 18 g 0    [DISCONTINUED] meclizine (ANTIVERT) 25 MG tablet Take 1 tablet by mouth 3 (Three) Times a Day As Needed for Dizziness or Nausea. 30 tablet 0     No current " facility-administered medications on file prior to visit.         HISTORY OF PRESENT ILLNESS      Nicko presents for annual health maintenance visit.    General health: good  Lifestyle:  Attempting to lose weight?: Yes   Diet: eats moderately healthy  Exercise: exercises rarely, walking  Tobacco: Never used   Alcohol: does not drink  Work: Not working and currently not looking  Reproductive health:  Sexually active?: No   Concern for STD?: No   Sexual problems?: No problems   Sees Urologist?: No   Depression Screenin/20/2024     1:42 PM   PHQ-2/PHQ-9 Depression Screening   Little Interest or Pleasure in Doing Things 0-->not at all   Feeling Down, Depressed or Hopeless 0-->not at all   PHQ-9: Brief Depression Severity Measure Score 0         PHQ-2: 0 (Not depressed)     PHQ-9: 0 (Negative screening for depression)    Patient Care Team:  Ciarra Rosenberg APRN as PCP - General (Family Medicine)  Chadd Parry MD as Consulting Physician (Otolaryngology)  Nixon Santos MD as Consulting Physician (Neurology)  ______________________________________________________________________    ALLERGIES  Allergies   Allergen Reactions    Amoxicillin Rash        PFSH:     The following portions of the patient's history were reviewed and updated as appropriate: Allergies / Current Medications / Past Medical History / Surgical History / Social History / Family History    PROBLEM LIST   Patient Active Problem List   Diagnosis    Attention deficit hyperactivity disorder (ADHD)    Pervasive developmental disorder, active    Epilepsy    Expressive language disorder    Generalized idiopathic epilepsy, not intractable, without status epilepticus    Insomnia    Retinitis pigmentosa of both eyes    Seizure    Sensorineural hearing loss (SNHL) of both ears       PAST MEDICAL HISTORY  Past Medical History:   Diagnosis Date    ADHD (attention deficit hyperactivity disorder) 1998    Anxiety 2016    School stress    History of  medical problems 1998    Asperger's (Autism Spectrum Disorder)    HL (hearing loss) 1994    after Scarlet Fever progressive    Liver disease June 1, 2021    Non Alcoholic Fatty Liver Disease weight gain from Depakote    Seizures 2003    Visual impairment 1994    Retinitis Pigmentosa       SURGICAL HISTORY  History reviewed. No pertinent surgical history.    SOCIAL HISTORY  Social History     Socioeconomic History    Marital status: Single   Tobacco Use    Smoking status: Never     Passive exposure: Never    Smokeless tobacco: Never    Tobacco comments:     Exposed to second hand smoke until 1999   Vaping Use    Vaping status: Never Used   Substance and Sexual Activity    Alcohol use: Not Currently     Comment: Social for special occasions - birthday, New Years [place]ine    Drug use: Never    Sexual activity: Never       FAMILY HISTORY  Family History   Problem Relation Age of Onset    Diabetes Maternal Grandfather         Type 2 late in life    Heart disease Maternal Grandfather         Cath, stent week before death    Hyperlipidemia Maternal Grandfather     Arthritis Maternal Grandmother         RA    Cancer Maternal Grandmother         Colon, Skin    COPD Maternal Grandmother     Hearing loss Maternal Grandmother         hearing aids    Heart disease Maternal Grandmother         A-fib    Hyperlipidemia Maternal Grandmother     Alcohol abuse Maternal Uncle         Sober for many years    Depression Maternal Uncle     Diabetes Maternal Uncle         Type 2    Hyperlipidemia Maternal Aunt     Hyperlipidemia Maternal Aunt     Hyperlipidemia Maternal Uncle        IMMUNIZATION HISTORY  Immunization History   Administered Date(s) Administered    COVID-19 (PFIZER) BIVALENT 12+YRS 12/06/2022    COVID-19 (PFIZER) Purple Cap Monovalent 04/08/2021, 04/29/2021, 12/09/2021    COVID-19 (UNSPECIFIED) 10/11/2023    DTaP 01/19/1991, 02/21/1991, 05/06/1991, 05/12/1992, 03/01/1996    Flu Vaccine Split Quad 10/24/2008, 10/30/2009,  11/05/2010, 10/07/2011, 10/08/2014, 10/28/2015    Flublok 18+yrs 12/06/2022    Fluzone (or Fluarix & Flulaval for VFC) >6mos 11/01/2016, 10/19/2017, 10/01/2018, 10/15/2019, 10/19/2020, 10/19/2021, 12/06/2022    Fluzone Quad >6mos (Multi-dose) 10/11/2023    H1N1 Inj 01/26/2010    Hepatitis B Adult/Adolescent IM 10/03/2001, 04/10/2002, 11/07/2002    Hib (PRP-T) 02/21/1991, 05/06/1991, 02/07/1992    IPV 01/14/1991, 02/21/1991, 05/12/1992, 03/01/1996    Influenza Quad Vaccine (Inpatient) 11/06/2012, 09/30/2013    MMR 02/07/1992, 03/01/1996    Meningococcal MCV4P (Menactra) 04/15/2008    OPV 01/14/1991, 02/21/1991, 05/12/1992, 03/01/1996    PPD Test 02/19/1993, 06/04/1999, 03/23/2000, 08/20/2002, 01/26/2010, 03/15/2011    Td (TDVAX) 03/30/2005    Tdap 04/15/2008, 06/20/2018         REVIEW OF SYSTEMS     Review of Systems   Constitutional:  Negative for chills, fever and unexpected weight change.   Respiratory:  Negative for cough, chest tightness and shortness of breath.    Cardiovascular:  Negative for chest pain, palpitations and leg swelling.   Neurological:  Negative for dizziness, weakness, light-headedness and headaches.   Psychiatric/Behavioral:  The patient is not nervous/anxious.        PHYSICAL EXAMINATION     Physical Exam  Vitals reviewed.   Constitutional:       General: He is not in acute distress.     Appearance: Normal appearance. He is not ill-appearing, toxic-appearing or diaphoretic.   HENT:      Head: Normocephalic and atraumatic.      Right Ear: Tympanic membrane, ear canal and external ear normal. There is no impacted cerumen.      Left Ear: Tympanic membrane, ear canal and external ear normal. There is no impacted cerumen.      Nose: Nose normal. No congestion or rhinorrhea.      Mouth/Throat:      Mouth: Mucous membranes are moist.      Pharynx: Oropharynx is clear. No oropharyngeal exudate or posterior oropharyngeal erythema.   Eyes:      Extraocular Movements: Extraocular movements intact.       "Conjunctiva/sclera: Conjunctivae normal.      Pupils: Pupils are equal, round, and reactive to light.   Cardiovascular:      Rate and Rhythm: Normal rate and regular rhythm.      Heart sounds: Normal heart sounds.   Pulmonary:      Effort: Pulmonary effort is normal. No respiratory distress.      Breath sounds: Normal breath sounds.   Abdominal:      General: Bowel sounds are normal.      Palpations: Abdomen is soft.      Tenderness: There is no abdominal tenderness.   Musculoskeletal:         General: Normal range of motion.      Cervical back: Normal range of motion and neck supple.      Right lower leg: No edema.      Left lower leg: No edema.   Lymphadenopathy:      Cervical: No cervical adenopathy.   Skin:     General: Skin is warm and dry.   Neurological:      General: No focal deficit present.      Mental Status: He is alert and oriented to person, place, and time. Mental status is at baseline.   Psychiatric:         Mood and Affect: Mood normal.         Behavior: Behavior normal.         Thought Content: Thought content normal.         Judgment: Judgment normal.         REVIEWED DATA      Labs:    Lab Results   Component Value Date     05/13/2024    K 4.1 05/13/2024    CALCIUM 9.9 05/13/2024    AST 23 05/13/2024    ALT 27 05/13/2024    BUN 11 05/13/2024    CREATININE 0.84 05/13/2024    CREATININE 0.88 06/15/2023    CREATININE 0.74 (L) 03/15/2023    EGFRIFNONA >60 11/14/2019    EGFRIFAFRI >60 11/14/2019       Lab Results   Component Value Date    GLUCOSE 90 05/13/2024    HGBA1C 5.5 06/15/2023    TSH 1.630 05/13/2024    FREET4 1.32 06/15/2023       No results found for: \"PSA\"    [unfilled]    Lab Results   Component Value Date    LDL 73 06/15/2023    HDL 38 (L) 06/15/2023    TRIG 94 06/15/2023    CHOLHDLRATIO 3.4 06/15/2023       No components found for: \"REQH630K\"    Lab Results   Component Value Date    WBC 8.42 05/13/2024    HGB 15.2 05/13/2024    MCV 87.6 05/13/2024     05/13/2024       Lab " "Results   Component Value Date    PROTEIN CANCELED 06/15/2023    GLUCOSEU CANCELED 06/15/2023        No results found for: \"HEPCVIRUSABY\"    Imaging:           Medical Tests:           ASSESSMENT & PLAN     ANNUAL WELLNESS EXAM / PHYSICAL     Diagnoses and all orders for this visit:    1. Annual physical exam (Primary)  -     Hemoglobin A1c  -     Lipid Panel With / Chol / HDL Ratio         Summary/Discussion:     Continue to follow regularly with neurology.  Encouraged healthy diet, regular exercise.      Next Appointment with me: Visit date not found    Return in about 1 year (around 6/20/2025) for Annual physical.      HEALTHCARE MAINTENANCE ISSUES       Cancer Screening:  Colon: Initial/Next screening at age: 45  Repeat colon cancer screening: N/A at this time  Prostate: Start screening at 50 then annually  Testicular: Recommended monthly self exam  Skin: Monthly self skin examination, annual exam by health professional  Lung: Does not meet criteria for lung cancer screening.   Other:    Screening Labs & Tests:  Lab results reviewed & discussed with with patient or orders placed today.  EKG:  CV Screening: Lipid panel  DEXA (75+ or risk factors):   HEP C (If born [birth year]68-[birth year] or risk factors): Previously had negative screen  Other:     Immunization/Vaccinations (to be given today unless deferred by patient)  Influenza: Recommended annual influenza vaccine  Hepatitis A: Verify immunization records  Hepatitis B: Verify immunization records  Tetanus/Pertussis: Up to date  Pneumovax/PCV: Not needed at this time  Shingles: Not needed at this time  COVID: Considering the latest booster   Lifestyle Counseling:  Lifestyle Modifications: Attempt to lose weight, Improve dietary compliance, Increase intensity/regularity of aerobic exercise, and Reduce exposure to stress if possible  Safety Issues: Always wear seatbelt, Avoid texting while driving   Use sunscreen, regular skin examination  Recommended annual dental/vision " examination.  Emotional/Stress/Sleep: Reviewed and  given when appropriate      Health Maintenance   Topic Date Due    INFLUENZA VACCINE  08/01/2024    BMI FOLLOWUP  02/01/2025    ANNUAL PHYSICAL  06/20/2025    TDAP/TD VACCINES (4 - Td or Tdap) 06/20/2028    HEPATITIS C SCREENING  Completed    COVID-19 Vaccine  Completed    Pneumococcal Vaccine 0-64  Aged Out

## 2024-06-21 LAB
CHOLEST SERPL-MCNC: 139 MG/DL (ref 0–200)
CHOLEST/HDLC SERPL: 3.97 {RATIO}
HBA1C MFR BLD: 5.4 % (ref 4.8–5.6)
HDLC SERPL-MCNC: 35 MG/DL (ref 40–60)
LDLC SERPL CALC-MCNC: 64 MG/DL (ref 0–100)
TRIGL SERPL-MCNC: 246 MG/DL (ref 0–150)
VLDLC SERPL CALC-MCNC: 40 MG/DL (ref 5–40)

## 2024-09-16 ENCOUNTER — OFFICE VISIT (OUTPATIENT)
Dept: NEUROLOGY | Facility: CLINIC | Age: 34
End: 2024-09-16
Payer: COMMERCIAL

## 2024-09-16 VITALS
HEART RATE: 113 BPM | OXYGEN SATURATION: 97 % | HEIGHT: 65 IN | SYSTOLIC BLOOD PRESSURE: 118 MMHG | WEIGHT: 155 LBS | DIASTOLIC BLOOD PRESSURE: 66 MMHG | BODY MASS INDEX: 25.83 KG/M2

## 2024-09-16 DIAGNOSIS — G40.309 GENERALIZED IDIOPATHIC EPILEPSY, NOT INTRACTABLE, WITHOUT STATUS EPILEPTICUS: ICD-10-CM

## 2024-09-16 PROCEDURE — 99214 OFFICE O/P EST MOD 30 MIN: CPT | Performed by: STUDENT IN AN ORGANIZED HEALTH CARE EDUCATION/TRAINING PROGRAM

## 2024-09-16 RX ORDER — LAMOTRIGINE 100 MG/1
200 TABLET ORAL 2 TIMES DAILY
Qty: 360 TABLET | Refills: 3 | Status: SHIPPED | OUTPATIENT
Start: 2024-09-16

## 2024-11-21 NOTE — PROGRESS NOTES
"    I N T E R N A L  M E D I C I N E  Ciarra Rosenberg, CORRINA    ENCOUNTER DATE:  11/22/2024    Nicko Vega / 34 y.o. / male      CHIEF COMPLAINT / REASON FOR OFFICE VISIT     bump on back (Bump has been on back for a month or two no hurting or burning. You can almost see where the bite is they don't know if he was biting by a insect. )      ASSESSMENT & PLAN     Diagnoses and all orders for this visit:    1. Dermoid cyst of skin of back (Primary)  -     Ambulatory Referral to Dermatology         SUMMARY/DISCUSSION  Patient re assured.  Suspect likely sebaceous cyst.  Recommend evaluation with dermatology.  Referral placed.  Discussed monitor for any signs of infection, including pain, redness, warmth, swelling, for which he would need to be treated with antibiotic.        Next Appointment with me: Visit date not found    Return for Next scheduled follow up.      VITAL SIGNS     Visit Vitals  /82   Pulse 97   Temp 98.2 °F (36.8 °C)   Resp 18   Ht 165.1 cm (65\")   Wt 69.9 kg (154 lb)   SpO2 99%   BMI 25.63 kg/m²             Wt Readings from Last 3 Encounters:   11/22/24 69.9 kg (154 lb)   09/16/24 70.3 kg (155 lb)   06/20/24 71.2 kg (157 lb)     Body mass index is 25.63 kg/m².        MEDICATIONS AT THE TIME OF OFFICE VISIT     Current Outpatient Medications on File Prior to Visit   Medication Sig Dispense Refill    Calcium-Magnesium-Vitamin D ER (Citracal Slow Release) 600- MG-MG-UNIT tablet sustained-release 24 hour       Cholecalciferol (Vitamin D-3) 25 MCG (1000 UT) capsule       fexofenadine (ALLEGRA) 180 MG tablet Take 1 tablet by mouth Daily.      guaifenesin-dextromethorphan (MUCINEX DM)  MG tablet sustained-release 12 hour tablet       lamoTRIgine (LaMICtal) 100 MG tablet Take 2 tablets by mouth 2 (Two) Times a Day. 360 tablet 3    multivitamin with minerals tablet tablet        No current facility-administered medications on file prior to visit.        HISTORY OF PRESENT ILLNESS " "    Noticed painless \"lump\" on back a couple months ago.  No overlying skin changes, redness, warmth, pain.  Does not have dermatologist.      Patient Care Team:  Ciarra Rosenberg APRN as PCP - General (Family Medicine)  Chadd Parry MD as Consulting Physician (Otolaryngology)  Nixon Santos MD as Consulting Physician (Neurology)    REVIEW OF SYSTEMS     Review of Systems   Constitutional:  Negative for chills, fever and unexpected weight change.   Respiratory:  Negative for cough, chest tightness and shortness of breath.    Cardiovascular:  Negative for chest pain, palpitations and leg swelling.   Skin:         +Cyst   Neurological:  Negative for dizziness, weakness, light-headedness and headaches.   Psychiatric/Behavioral:  The patient is not nervous/anxious.           PHYSICAL EXAMINATION     Physical Exam  Vitals reviewed.   Constitutional:       General: He is not in acute distress.     Appearance: Normal appearance. He is not ill-appearing, toxic-appearing or diaphoretic.   HENT:      Head: Normocephalic and atraumatic.   Cardiovascular:      Rate and Rhythm: Normal rate and regular rhythm.      Heart sounds: Normal heart sounds.   Pulmonary:      Effort: Pulmonary effort is normal.      Breath sounds: Normal breath sounds.   Skin:     General: Skin is warm and dry.      Comments: Likely sebaceous cyst over midline thoracic spine, no overlying redness, warmth.  Able to secrete scant amount of serosanguineous drainage.     Neurological:      Mental Status: He is alert and oriented to person, place, and time. Mental status is at baseline.   Psychiatric:         Mood and Affect: Mood normal.         Behavior: Behavior normal.         Thought Content: Thought content normal.         Judgment: Judgment normal.           REVIEWED DATA     Labs:           Imaging:            Medical Tests:           Summary of old records / correspondence / consultant report:           Request outside records:         "

## 2024-11-22 ENCOUNTER — OFFICE VISIT (OUTPATIENT)
Dept: INTERNAL MEDICINE | Age: 34
End: 2024-11-22
Payer: COMMERCIAL

## 2024-11-22 VITALS
OXYGEN SATURATION: 99 % | DIASTOLIC BLOOD PRESSURE: 82 MMHG | WEIGHT: 154 LBS | BODY MASS INDEX: 25.66 KG/M2 | SYSTOLIC BLOOD PRESSURE: 112 MMHG | RESPIRATION RATE: 18 BRPM | HEART RATE: 97 BPM | HEIGHT: 65 IN | TEMPERATURE: 98.2 F

## 2024-11-22 DIAGNOSIS — D23.5 DERMOID CYST OF SKIN OF BACK: Primary | ICD-10-CM

## 2024-11-22 PROCEDURE — 99212 OFFICE O/P EST SF 10 MIN: CPT

## 2025-01-22 ENCOUNTER — OFFICE VISIT (OUTPATIENT)
Dept: NEUROLOGY | Facility: CLINIC | Age: 35
End: 2025-01-22
Payer: COMMERCIAL

## 2025-01-22 VITALS
DIASTOLIC BLOOD PRESSURE: 70 MMHG | HEIGHT: 65 IN | OXYGEN SATURATION: 98 % | HEART RATE: 69 BPM | BODY MASS INDEX: 26.16 KG/M2 | WEIGHT: 157 LBS | SYSTOLIC BLOOD PRESSURE: 118 MMHG

## 2025-01-22 DIAGNOSIS — G40.309 GENERALIZED IDIOPATHIC EPILEPSY, NOT INTRACTABLE, WITHOUT STATUS EPILEPTICUS: Primary | ICD-10-CM

## 2025-01-22 PROCEDURE — 99214 OFFICE O/P EST MOD 30 MIN: CPT | Performed by: STUDENT IN AN ORGANIZED HEALTH CARE EDUCATION/TRAINING PROGRAM

## 2025-01-22 PROCEDURE — 1159F MED LIST DOCD IN RCRD: CPT | Performed by: STUDENT IN AN ORGANIZED HEALTH CARE EDUCATION/TRAINING PROGRAM

## 2025-01-22 PROCEDURE — 1160F RVW MEDS BY RX/DR IN RCRD: CPT | Performed by: STUDENT IN AN ORGANIZED HEALTH CARE EDUCATION/TRAINING PROGRAM

## 2025-01-22 RX ORDER — LEVETIRACETAM 500 MG/1
500 TABLET ORAL 2 TIMES DAILY
Qty: 180 TABLET | Refills: 1 | Status: SHIPPED | OUTPATIENT
Start: 2025-01-22

## 2025-01-22 NOTE — PROGRESS NOTES
Chief Complaint   Patient presents with    Seizures       Patient ID: Nicko Vega is a 34 y.o. male.    HPI:    The following portions of the patient's history were reviewed and updated as appropriate: allergies, current medications, past family history, past medical history, past social history, past surgical history and problem list.    Interval history:    Review of Systems   Eyes:  Positive for visual disturbance.   Neurological:  Positive for dizziness, seizures (9/27/2025, a few after that) and headaches. Negative for tremors, syncope, facial asymmetry, speech difficulty, weakness, light-headedness and numbness.      Mr. Vega is a 34-year-old male with a history of seizures here for follow up. He had seizure in September at Karaoke. No flashing lights at karaoke. Someone else was singing. Has felt dizzy and headache at karaoke but did not progress to full seizure. There may have been another seizure in October or November. Cluster in September which happened in past but is rarer now. Didn't have aura of waterfall before the event but felt dizzy.    Vitals:    01/22/25 1140   BP: 118/70   Pulse: 69   SpO2: 98%       Neurological Exam    Physical Exam    Procedures    Assessment/Plan:    The patient has epilepsy based on clinical history and testing with breakthrough seizures. Continue lamotrigine 200mg twice a day.  Seizure precautions discussed.  The patient is blind and does not drive.  He has had elevated LFTs in the past but recent labs reviewed and no large abnormalities. Several sz since increase.  -The patient has epilepsy which is a chronic condition that poses a threat to life or serious harm  -Prescription medications are managed in this visit  -Add Keppra 500 mg twice a day with low threshold to increase to 1000 mg twice a day as he had trouble with liver function labs in the past.           Diagnoses and all orders for this visit:    1. Generalized idiopathic epilepsy, not intractable,  without status epilepticus (Primary)    Other orders  -     levETIRAcetam (KEPPRA) 500 MG tablet; Take 1 tablet by mouth 2 (Two) Times a Day.  Dispense: 180 tablet; Refill: 1           Nixon Santos MD

## 2025-02-25 DIAGNOSIS — H90.3 SENSORINEURAL HEARING LOSS (SNHL) OF BOTH EARS: Primary | ICD-10-CM

## 2025-04-14 RX ORDER — LEVETIRACETAM 500 MG/1
TABLET ORAL
Qty: 270 TABLET | Refills: 1 | Status: SHIPPED | OUTPATIENT
Start: 2025-04-14

## 2025-05-15 ENCOUNTER — OFFICE VISIT (OUTPATIENT)
Dept: INTERNAL MEDICINE | Age: 35
End: 2025-05-15
Payer: COMMERCIAL

## 2025-05-15 VITALS
BODY MASS INDEX: 26.33 KG/M2 | DIASTOLIC BLOOD PRESSURE: 82 MMHG | HEIGHT: 65 IN | RESPIRATION RATE: 18 BRPM | SYSTOLIC BLOOD PRESSURE: 118 MMHG | OXYGEN SATURATION: 98 % | WEIGHT: 158 LBS | TEMPERATURE: 95.9 F | HEART RATE: 95 BPM

## 2025-05-15 DIAGNOSIS — R30.0 DYSURIA: Primary | ICD-10-CM

## 2025-05-15 LAB
BILIRUB BLD-MCNC: NEGATIVE MG/DL
CLARITY, POC: CLEAR
COLOR UR: YELLOW
EXPIRATION DATE: ABNORMAL
GLUCOSE UR STRIP-MCNC: NEGATIVE MG/DL
KETONES UR QL: NEGATIVE
LEUKOCYTE EST, POC: ABNORMAL
Lab: ABNORMAL
NITRITE UR-MCNC: NEGATIVE MG/ML
PH UR: 6 [PH] (ref 5–8)
PROT UR STRIP-MCNC: NEGATIVE MG/DL
RBC # UR STRIP: NEGATIVE /UL
SP GR UR: 1.01 (ref 1–1.03)
UROBILINOGEN UR QL: ABNORMAL

## 2025-05-15 NOTE — PROGRESS NOTES
"                            ОЛЬГА GRAY PA-C                  I  N  T  E  R  N  A  L    M  E  D  I  C  I  N  E         ENCOUNTER DATE:  05/15/2025    Nicko Vega / 34 y.o. / male    OFFICE VISIT ENCOUNTER       CHIEF COMPLAINT / REASON FOR OFFICE VISIT     Urinary Tract Infection (Pt states there is itching in the area. Not as much volume urine; )      ASSESSMENT & PLAN     Problem List Items Addressed This Visit    None  Visit Diagnoses         Dysuria    -  Primary    Relevant Orders    POCT urinalysis dipstick, automated (Completed)          Orders Placed This Encounter   Procedures    POCT urinalysis dipstick, automated     Release to patient:   Routine Release [[id number]]     No orders of the defined types were placed in this encounter.      SUMMARY/DISCUSSION  Dysuria symptoms started over the weekend but have now resolved.  POCT Urinalysis dipstick: Positive for Leukocytes only.   Increased oral hydration with water counseled.     Latest Reference Range & Units 05/15/25 13:11   Color, UA Yellow, Straw, Dark Yellow, Danna  Yellow   Appearance, UA Clear  Clear   Specific Gravity, UA 1.005 - 1.030  1.010   pH, UA 5.0 - 8.0  6.0   Glucose Negative mg/dL Negative   Ketones, UA Negative  Negative   Blood, UA Negative  Negative   Nitrite, UA Negative  Negative   Leukocytes, UA Negative  Trace !   Protein, UA Negative mg/dL Negative   Bilirubin, UA Negative  Negative   Urobilinogen, UA Normal, 0.2 E.U./dL  0.2 E.U./dL   !: Data is abnormal    Next Appointment:     Return for Follow-up with CORRINA Way as scheduled 6/26/25.      VITAL SIGNS     Vitals:    05/15/25 1242   BP: 118/82   BP Location: Left arm   Patient Position: Sitting   Cuff Size: Adult   Pulse: 95   Resp: 18   Temp: 95.9 °F (35.5 °C)   TempSrc: Temporal   SpO2: 98%   Weight: 71.7 kg (158 lb)   Height: 165.1 cm (65\")       BP Readings from Last 3 Encounters:   05/15/25 118/82   01/22/25 118/70   11/22/24 112/82     Wt Readings from " Last 3 Encounters:   05/15/25 71.7 kg (158 lb)   01/22/25 71.2 kg (157 lb)   11/22/24 69.9 kg (154 lb)     Body mass index is 26.29 kg/m².         No data to display                   MEDICATIONS AT THE TIME OF OFFICE VISIT     Current Outpatient Medications on File Prior to Visit   Medication Sig    Calcium-Magnesium-Vitamin D ER (Citracal Slow Release) 600- MG-MG-UNIT tablet sustained-release 24 hour     Cholecalciferol (Vitamin D-3) 25 MCG (1000 UT) capsule     fexofenadine (ALLEGRA) 180 MG tablet Take 1 tablet by mouth Daily.    guaifenesin-dextromethorphan (MUCINEX DM)  MG tablet sustained-release 12 hour tablet     lamoTRIgine (LaMICtal) 100 MG tablet Take 2 tablets by mouth 2 (Two) Times a Day.    levETIRAcetam (KEPPRA) 500 MG tablet Take 500mg in the morning and 1000mg in the evenings.    multivitamin with minerals tablet tablet      No current facility-administered medications on file prior to visit.          HISTORY OF PRESENT ILLNESS     He reports symptoms of itching within the shaft of his penis and reduced urinary volume on Saturday. His symptoms have since began to improve over the last couple of days, but his aunt decided to keep today's appointment. He denies altered urine smell, burning with urination, drainage from penis, sexual intercourse, fever, chills, flank pain, or abdomen pain. POCT Urinalysis is normal.     Patient Care Team:  Ciarra Rosenberg APRN as PCP - General (Family Medicine)  Chadd Parry MD as Consulting Physician (Otolaryngology)  Nixon Santos MD as Consulting Physician (Neurology)    REVIEW OF SYSTEMS     Review of Systems   As Per HPI.  All other systems negative.     Answers submitted by the patient for this visit:  Painful Urination Questionnaire (Submitted on 5/14/2025)  Chief Complaint: Dysuria  Chronicity: new  Onset: in the past 7 days  Frequency: intermittently  Progression since onset: improving  Pain quality: aching  Pain - numeric: 3/10  Fever: no  "fever  Sexually active?: No  History of pyelonephritis?: No  discharge: No  hesitancy: No      PHYSICAL EXAMINATION     Physical Exam  Vitals and nursing note reviewed.   Constitutional:       General: He is not in acute distress.     Appearance: Normal appearance. He is not ill-appearing.   Cardiovascular:      Rate and Rhythm: Normal rate and regular rhythm.      Pulses: Normal pulses.      Heart sounds: Normal heart sounds. No murmur heard.     No friction rub. No gallop.   Pulmonary:      Effort: Pulmonary effort is normal. No respiratory distress.      Breath sounds: Normal breath sounds. No stridor. No wheezing.   Abdominal:      General: Abdomen is flat. Bowel sounds are normal. There is no distension.      Palpations: Abdomen is soft.      Tenderness: There is no abdominal tenderness. There is no right CVA tenderness, left CVA tenderness or guarding.   Neurological:      Mental Status: He is alert.   Psychiatric:         Mood and Affect: Mood normal.         Behavior: Behavior normal.             REVIEWED DATA     Labs:     Lab Results   Component Value Date     05/13/2024    K 4.1 05/13/2024    CALCIUM 9.9 05/13/2024    AST 23 05/13/2024    ALT 27 05/13/2024    BUN 11 05/13/2024    CREATININE 0.84 05/13/2024    CREATININE 0.88 06/15/2023    CREATININE 0.74 (L) 03/15/2023     Lab Results   Component Value Date    HGBA1C 5.40 06/20/2024    HGBA1C 5.5 06/15/2023     Lab Results   Component Value Date    LDL 64 06/20/2024    LDL 73 06/15/2023    HDL 35 (L) 06/20/2024    HDL 38 (L) 06/15/2023    TRIG 246 (H) 06/20/2024    TRIG 94 06/15/2023     Lab Results   Component Value Date    TSH 1.630 05/13/2024    TSH 2.210 06/15/2023    FREET4 1.32 06/15/2023     Lab Results   Component Value Date    WBC 8.42 05/13/2024    HGB 15.2 05/13/2024     05/13/2024   No results found for: \"MALBCRERATIO\"          Imaging:               Medical Tests:               Summary of old records / correspondence / consultant " report:             Request outside records:

## 2025-05-19 ENCOUNTER — LAB (OUTPATIENT)
Dept: LAB | Facility: HOSPITAL | Age: 35
End: 2025-05-19
Payer: COMMERCIAL

## 2025-05-19 ENCOUNTER — TELEPHONE (OUTPATIENT)
Dept: NEUROLOGY | Facility: CLINIC | Age: 35
End: 2025-05-19

## 2025-05-19 ENCOUNTER — OFFICE VISIT (OUTPATIENT)
Dept: NEUROLOGY | Facility: CLINIC | Age: 35
End: 2025-05-19
Payer: COMMERCIAL

## 2025-05-19 VITALS
HEART RATE: 105 BPM | HEIGHT: 65 IN | DIASTOLIC BLOOD PRESSURE: 68 MMHG | SYSTOLIC BLOOD PRESSURE: 116 MMHG | WEIGHT: 156 LBS | BODY MASS INDEX: 25.99 KG/M2 | OXYGEN SATURATION: 98 %

## 2025-05-19 DIAGNOSIS — G40.309 GENERALIZED IDIOPATHIC EPILEPSY, NOT INTRACTABLE, WITHOUT STATUS EPILEPTICUS: Primary | ICD-10-CM

## 2025-05-19 LAB
ALBUMIN SERPL-MCNC: 4.6 G/DL (ref 3.5–5.2)
ALBUMIN/GLOB SERPL: 1.4 G/DL
ALP SERPL-CCNC: 49 U/L (ref 39–117)
ALT SERPL W P-5'-P-CCNC: 23 U/L (ref 1–41)
ANION GAP SERPL CALCULATED.3IONS-SCNC: 6.8 MMOL/L (ref 5–15)
AST SERPL-CCNC: 25 U/L (ref 1–40)
BASOPHILS # BLD AUTO: 0.05 10*3/MM3 (ref 0–0.2)
BASOPHILS NFR BLD AUTO: 0.6 % (ref 0–1.5)
BILIRUB SERPL-MCNC: 0.4 MG/DL (ref 0–1.2)
BUN SERPL-MCNC: 15 MG/DL (ref 6–20)
BUN/CREAT SERPL: 16.5 (ref 7–25)
CALCIUM SPEC-SCNC: 10 MG/DL (ref 8.6–10.5)
CHLORIDE SERPL-SCNC: 103 MMOL/L (ref 98–107)
CO2 SERPL-SCNC: 31.2 MMOL/L (ref 22–29)
CREAT SERPL-MCNC: 0.91 MG/DL (ref 0.76–1.27)
DEPRECATED RDW RBC AUTO: 38.8 FL (ref 37–54)
EGFRCR SERPLBLD CKD-EPI 2021: 113.4 ML/MIN/1.73
EOSINOPHIL # BLD AUTO: 0.2 10*3/MM3 (ref 0–0.4)
EOSINOPHIL NFR BLD AUTO: 2.5 % (ref 0.3–6.2)
ERYTHROCYTE [DISTWIDTH] IN BLOOD BY AUTOMATED COUNT: 11.9 % (ref 12.3–15.4)
GLOBULIN UR ELPH-MCNC: 3.4 GM/DL
GLUCOSE SERPL-MCNC: 87 MG/DL (ref 65–99)
HCT VFR BLD AUTO: 43.5 % (ref 37.5–51)
HGB BLD-MCNC: 14.6 G/DL (ref 13–17.7)
IMM GRANULOCYTES # BLD AUTO: 0.03 10*3/MM3 (ref 0–0.05)
IMM GRANULOCYTES NFR BLD AUTO: 0.4 % (ref 0–0.5)
LYMPHOCYTES # BLD AUTO: 1.99 10*3/MM3 (ref 0.7–3.1)
LYMPHOCYTES NFR BLD AUTO: 25.2 % (ref 19.6–45.3)
MCH RBC QN AUTO: 29.9 PG (ref 26.6–33)
MCHC RBC AUTO-ENTMCNC: 33.6 G/DL (ref 31.5–35.7)
MCV RBC AUTO: 89.1 FL (ref 79–97)
MONOCYTES # BLD AUTO: 0.52 10*3/MM3 (ref 0.1–0.9)
MONOCYTES NFR BLD AUTO: 6.6 % (ref 5–12)
NEUTROPHILS NFR BLD AUTO: 5.1 10*3/MM3 (ref 1.7–7)
NEUTROPHILS NFR BLD AUTO: 64.7 % (ref 42.7–76)
NRBC BLD AUTO-RTO: 0 /100 WBC (ref 0–0.2)
PLATELET # BLD AUTO: 310 10*3/MM3 (ref 140–450)
PMV BLD AUTO: 9.2 FL (ref 6–12)
POTASSIUM SERPL-SCNC: 4.9 MMOL/L (ref 3.5–5.2)
PROT SERPL-MCNC: 8 G/DL (ref 6–8.5)
RBC # BLD AUTO: 4.88 10*6/MM3 (ref 4.14–5.8)
SODIUM SERPL-SCNC: 141 MMOL/L (ref 136–145)
WBC NRBC COR # BLD AUTO: 7.89 10*3/MM3 (ref 3.4–10.8)

## 2025-05-19 PROCEDURE — 36415 COLL VENOUS BLD VENIPUNCTURE: CPT | Performed by: STUDENT IN AN ORGANIZED HEALTH CARE EDUCATION/TRAINING PROGRAM

## 2025-05-19 PROCEDURE — 1160F RVW MEDS BY RX/DR IN RCRD: CPT | Performed by: STUDENT IN AN ORGANIZED HEALTH CARE EDUCATION/TRAINING PROGRAM

## 2025-05-19 PROCEDURE — 99214 OFFICE O/P EST MOD 30 MIN: CPT | Performed by: STUDENT IN AN ORGANIZED HEALTH CARE EDUCATION/TRAINING PROGRAM

## 2025-05-19 PROCEDURE — 1159F MED LIST DOCD IN RCRD: CPT | Performed by: STUDENT IN AN ORGANIZED HEALTH CARE EDUCATION/TRAINING PROGRAM

## 2025-05-19 PROCEDURE — 80050 GENERAL HEALTH PANEL: CPT | Performed by: STUDENT IN AN ORGANIZED HEALTH CARE EDUCATION/TRAINING PROGRAM

## 2025-05-19 PROCEDURE — 80175 DRUG SCREEN QUAN LAMOTRIGINE: CPT | Performed by: STUDENT IN AN ORGANIZED HEALTH CARE EDUCATION/TRAINING PROGRAM

## 2025-05-19 RX ORDER — LEVETIRACETAM 750 MG/1
TABLET ORAL
Qty: 180 TABLET | Refills: 3 | Status: SHIPPED | OUTPATIENT
Start: 2025-05-19 | End: 2025-05-19

## 2025-05-19 RX ORDER — LEVETIRACETAM 750 MG/1
750 TABLET ORAL 2 TIMES DAILY
Qty: 180 TABLET | Refills: 3 | Status: SHIPPED | OUTPATIENT
Start: 2025-05-19 | End: 2025-05-20 | Stop reason: SDUPTHER

## 2025-05-19 NOTE — PROGRESS NOTES
Chief Complaint   Patient presents with    Seizures       Patient ID: Nicko Vega is a 34 y.o. male.    HPI:    Mr. Vega is a 32-year-old male with a history of seizures presenting as a follow up referred by [address].  He was seen by an epilepsy clinic in [place].  His last visit around that time per the notes was January 2022 he was on Depakote in the past but this was discontinued and he takes lamotrigine 100 twice daily.  He had May 2021 blood work which indicated elevated but improved liver function tests.  He had ultrasound of his liver and was diagnosed with fatty liver disease.  He implemented lifestyle and dietary changes that led to 33 pounds of weight loss and normal LFTs.  He has had normal therapeutic range lamotrigine in the past.  He reportedly has not had side effects on lamotrigine and is good about taking medications and he did not have seizures in a year and his last seizure was documented as 2018.  In terms of his past epilepsy history he had a tonic-clonic seizure when he was 13 and was started on Depakote he had febrile seizures as a toddler and absence seizure's through childhood his EEG in 2007 demonstrated occasional sharp waves from the frontal region predominantly right frontal.  Repeat EEG showed absence seizure's characterized by spacing out and triggered by hyperventilation.  It was thought in the past that he had absent seizures and generalized epilepsy but one of the neurologist suspected that he had focal seizures.  He had an MRI of the brain which showed diminutive ocular globes bilaterally with no definite mesial temporal abnormality no definite evidence of neuronal migration abnormally gray matter heterotopia or cortical dysplasia.  He was switched from Depakote to oxcarbazepine and had a small seizure and had mood changes and Depakote was resumed and oxcarbazepine dosage was reduced and I assume later stopped as he is not on it now.  He has an unknown genetic  syndrome with retinitis pigmentosa with bilateral sensorineural hearing loss and high functioning Asperger's syndrome.  He has had various spell types 1 of which is characterized by generalized tonic-clonic seizures with an aura of sometimes having the feeling of the sound of a waterfall and/or headache and dizziness prior to seizure.  Postictally he is confused with these.  In the past he has had absence seizures staring and spacing out with an aura of feeling the sound of waterfall.  Triggers can include hyperventilation he lives with his and.  He has had elevated LFTs in the past and lamotrigine level within therapeutic range in the past reportedly.  His liver function enzymes were normal with his last check.  His lamotrigine level was 6.8 3 months ago and his CBC was normal.  Age of onset of seizure was 2003. Most of what he has had are that he goes into a trance, bubbles at the mouth. He confirms that he has a waterfall sound prior to having a seizure, shortly before. Last in 2018 in setting of oxcarbazepine. Per EMR, hyperventilation can be a trigger. Aunt notes stress can be a seizure. College can be overwhelming.  Aunt notes a shoulder twitch at times. Enjoys theater and musicals. No seizures, no clear side effects on lamotrigine. Photosensitive but unable to see. Has hearing impairment.      Risk factors  Birth - on time, no known complications  Development - Asperger's syndrome and retinitis pigmentosa. Graduated with a theater degree  CNS infection - none  Has history of retinitis pigmentosa.  Head injury- none  Family history - none known of sz but don't know genetics of biological father     Prior meds VPA, OXC  Current meds  BID         The following portions of the patient's history were reviewed and updated as appropriate: allergies, current medications, past family history, past medical history, past social history, past surgical history and problem list.    Interval history:    Review of  Systems   HENT:  Positive for hearing loss.    Eyes:  Positive for visual disturbance.   Neurological:  Positive for seizures (4/8/2025). Negative for dizziness, tremors, syncope, facial asymmetry, speech difficulty, weakness, light-headedness, numbness and headaches.        Mr. Vega is a 34-year-old male with a history of seizures here for follow up. He had seizure in April during a hearing test.   While being tested for an assessment for a Cochlear Implant, CJ was exposed to high volume sounds. He complained that it hurt his ears and felt like it was vibrating the fluid around his brain. Part way through the test, he went into a petit mal seizure. He reported that he did not have the usual warning symptoms before it occurred. It was brief and he wasn’t as groggy afterwards. He also reported that he might have had a mild seizure on two different occasions in the past couple of weeks but mom wasn’t present for either one of them.    He states today it felt like the brain was vibrating to the cochlear implant test sounds prior to seizure.    Vitals:    05/19/25 1503   BP: 116/68   Pulse: 105   SpO2: 98%       Neurological Exam    Physical Exam    Procedures    Assessment/Plan:    The patient has epilepsy based on clinical history and testing with breakthrough seizures. Continue lamotrigine 200mg twice a day.  Seizure precautions discussed.  The patient is blind and does not drive.  He has had elevated LFTs in the past but recent labs reviewed and no large abnormalities. Several sz since increase.  -The patient has epilepsy which is a chronic condition that poses a threat to life or serious harm  -Prescription medications are managed in this visit  -adjust LEV to 750 BID with low threshold to increase to 1000 mg twice a day         Diagnoses and all orders for this visit:    1. Generalized idiopathic epilepsy, not intractable, without status epilepticus (Primary)  -     Comprehensive Metabolic Panel  -     CBC &  Differential  -     TSH Rfx On Abnormal To Free T4  -     Lamotrigine Level    Other orders  -     Discontinue: levETIRAcetam (KEPPRA) 750 MG tablet; Take 500mg in the morning and 1000mg in the evenings.  Dispense: 180 tablet; Refill: 3  -     levETIRAcetam (KEPPRA) 750 MG tablet; Take 1 tablet by mouth 2 (Two) Times a Day. Take 500mg in the morning and 1000mg in the evenings.  Dispense: 180 tablet; Refill: 3           Nixon Santos MD

## 2025-05-19 NOTE — TELEPHONE ENCOUNTER
[facility] called and was asking about Levetiracetam 750 mg  RX        Take 1 tablet by mouth 2 (Two) Times a Day. Take 500mg in the morning and 1000mg in the evenings.

## 2025-05-20 LAB — TSH SERPL DL<=0.05 MIU/L-ACNC: 1.41 UIU/ML (ref 0.27–4.2)

## 2025-05-20 RX ORDER — LEVETIRACETAM 750 MG/1
750 TABLET ORAL 2 TIMES DAILY
Qty: 180 TABLET | Refills: 3 | Status: SHIPPED | OUTPATIENT
Start: 2025-05-20

## 2025-05-22 LAB — LAMOTRIGINE SERPL-MCNC: 14.5 UG/ML (ref 2–20)

## 2025-08-26 ENCOUNTER — OFFICE VISIT (OUTPATIENT)
Dept: INTERNAL MEDICINE | Age: 35
End: 2025-08-26
Payer: COMMERCIAL

## 2025-08-26 VITALS
HEART RATE: 97 BPM | SYSTOLIC BLOOD PRESSURE: 122 MMHG | DIASTOLIC BLOOD PRESSURE: 82 MMHG | BODY MASS INDEX: 26.56 KG/M2 | RESPIRATION RATE: 17 BRPM | OXYGEN SATURATION: 99 % | TEMPERATURE: 96.9 F | HEIGHT: 65 IN | WEIGHT: 159.4 LBS

## 2025-08-26 DIAGNOSIS — R10.13 EPIGASTRIC ABDOMINAL PAIN: ICD-10-CM

## 2025-08-26 DIAGNOSIS — Z00.00 ANNUAL PHYSICAL EXAM: Primary | ICD-10-CM

## 2025-08-26 DIAGNOSIS — Z01.818 PRE-OPERATIVE CLEARANCE: ICD-10-CM

## 2025-08-27 ENCOUNTER — HOSPITAL ENCOUNTER (OUTPATIENT)
Facility: HOSPITAL | Age: 35
Discharge: HOME | End: 2025-08-27
Payer: COMMERCIAL

## 2025-08-27 ENCOUNTER — LAB (OUTPATIENT)
Facility: HOSPITAL | Age: 35
End: 2025-08-27
Payer: COMMERCIAL

## 2025-08-27 DIAGNOSIS — Z01.818 PRE-OPERATIVE CLEARANCE: ICD-10-CM

## 2025-08-27 LAB
ALBUMIN SERPL-MCNC: 4.3 G/DL (ref 3.5–5.2)
ALBUMIN/GLOB SERPL: 1.3 G/DL
ALP SERPL-CCNC: 43 U/L (ref 39–117)
ALT SERPL W P-5'-P-CCNC: 28 U/L (ref 1–41)
ANION GAP SERPL CALCULATED.3IONS-SCNC: 10.1 MMOL/L (ref 5–15)
AST SERPL-CCNC: 24 U/L (ref 1–40)
BACTERIA UR QL AUTO: ABNORMAL /HPF
BILIRUB SERPL-MCNC: 0.5 MG/DL (ref 0–1.2)
BILIRUB UR QL STRIP: NEGATIVE
BUN SERPL-MCNC: 11 MG/DL (ref 6–20)
BUN/CREAT SERPL: 13.3 (ref 7–25)
CALCIUM SPEC-SCNC: 9.7 MG/DL (ref 8.6–10.5)
CHLORIDE SERPL-SCNC: 103 MMOL/L (ref 98–107)
CHOLEST SERPL-MCNC: 130 MG/DL (ref 0–200)
CLARITY UR: CLEAR
CO2 SERPL-SCNC: 26.9 MMOL/L (ref 22–29)
COLOR UR: YELLOW
CREAT SERPL-MCNC: 0.83 MG/DL (ref 0.76–1.27)
EGFRCR SERPLBLD CKD-EPI 2021: 117.8 ML/MIN/1.73
GLOBULIN UR ELPH-MCNC: 3.3 GM/DL
GLUCOSE SERPL-MCNC: 93 MG/DL (ref 65–99)
GLUCOSE UR STRIP-MCNC: NEGATIVE MG/DL
HBA1C MFR BLD: 5.3 % (ref 4.8–5.6)
HDLC SERPL QL: 3.42
HDLC SERPL-MCNC: 38 MG/DL (ref 40–60)
HGB UR QL STRIP.AUTO: NEGATIVE
HYALINE CASTS UR QL AUTO: ABNORMAL /LPF
KETONES UR QL STRIP: NEGATIVE
LDLC SERPL CALC-MCNC: 78 MG/DL (ref 0–100)
LEUKOCYTE ESTERASE UR QL STRIP.AUTO: ABNORMAL
NITRITE UR QL STRIP: NEGATIVE
PH UR STRIP.AUTO: 6 [PH] (ref 5–8)
POTASSIUM SERPL-SCNC: 4.1 MMOL/L (ref 3.5–5.2)
PROT SERPL-MCNC: 7.6 G/DL (ref 6–8.5)
PROT UR QL STRIP: NEGATIVE
RBC # UR STRIP: ABNORMAL /HPF
REF LAB TEST METHOD: ABNORMAL
SODIUM SERPL-SCNC: 140 MMOL/L (ref 136–145)
SP GR UR STRIP: 1.02 (ref 1–1.03)
SQUAMOUS #/AREA URNS HPF: ABNORMAL /HPF
TRIGL SERPL-MCNC: 69 MG/DL (ref 0–150)
UROBILINOGEN UR QL STRIP: ABNORMAL
VLDLC SERPL-MCNC: 14 MG/DL (ref 5–40)
WBC # UR STRIP: ABNORMAL /HPF

## 2025-08-27 PROCEDURE — 80061 LIPID PANEL: CPT

## 2025-08-27 PROCEDURE — 83036 HEMOGLOBIN GLYCOSYLATED A1C: CPT

## 2025-08-27 PROCEDURE — 71046 X-RAY EXAM CHEST 2 VIEWS: CPT

## 2025-08-27 PROCEDURE — 81001 URINALYSIS AUTO W/SCOPE: CPT

## 2025-08-27 PROCEDURE — 80053 COMPREHEN METABOLIC PANEL: CPT

## 2025-08-27 PROCEDURE — 36415 COLL VENOUS BLD VENIPUNCTURE: CPT
